# Patient Record
Sex: MALE | Race: WHITE | Employment: OTHER | URBAN - METROPOLITAN AREA
[De-identification: names, ages, dates, MRNs, and addresses within clinical notes are randomized per-mention and may not be internally consistent; named-entity substitution may affect disease eponyms.]

---

## 2019-03-29 ENCOUNTER — HOSPITAL ENCOUNTER (OUTPATIENT)
Dept: PREADMISSION TESTING | Age: 74
Discharge: HOME | End: 2019-03-29
Payer: MEDICARE

## 2019-03-29 ENCOUNTER — HOSPITAL ENCOUNTER (OUTPATIENT)
Dept: NON INVASIVE DIAGNOSTICS | Age: 74
Discharge: HOME | End: 2019-03-29
Attending: OTOLARYNGOLOGY
Payer: MEDICARE

## 2019-03-29 VITALS
SYSTOLIC BLOOD PRESSURE: 140 MMHG | RESPIRATION RATE: 16 BRPM | WEIGHT: 272.4 LBS | HEIGHT: 74 IN | HEART RATE: 73 BPM | OXYGEN SATURATION: 96 % | TEMPERATURE: 98.7 F | DIASTOLIC BLOOD PRESSURE: 70 MMHG | BODY MASS INDEX: 34.96 KG/M2

## 2019-03-29 LAB
ANION GAP SERPL CALC-SCNC: 7 MMOL/L (ref 5–15)
ATRIAL RATE: 54 BPM
BASOPHILS # BLD: 0 K/UL (ref 0–0.1)
BASOPHILS NFR BLD: 1 % (ref 0–1)
BUN SERPL-MCNC: 31 MG/DL (ref 6–20)
BUN/CREAT SERPL: 15 (ref 12–20)
CALCIUM SERPL-MCNC: 8.8 MG/DL (ref 8.5–10.1)
CALCULATED R AXIS, ECG10: 57 DEGREES
CALCULATED T AXIS, ECG11: 22 DEGREES
CHLORIDE SERPL-SCNC: 106 MMOL/L (ref 97–108)
CO2 SERPL-SCNC: 25 MMOL/L (ref 21–32)
CREAT SERPL-MCNC: 2.04 MG/DL (ref 0.7–1.3)
DIAGNOSIS, 93000: NORMAL
DIFFERENTIAL METHOD BLD: ABNORMAL
EOSINOPHIL # BLD: 0.3 K/UL (ref 0–0.4)
EOSINOPHIL NFR BLD: 4 % (ref 0–7)
ERYTHROCYTE [DISTWIDTH] IN BLOOD BY AUTOMATED COUNT: 14.6 % (ref 11.5–14.5)
GLUCOSE SERPL-MCNC: 229 MG/DL (ref 65–100)
HCT VFR BLD AUTO: 36.6 % (ref 36.6–50.3)
HGB BLD-MCNC: 12 G/DL (ref 12.1–17)
IMM GRANULOCYTES # BLD AUTO: 0.1 K/UL (ref 0–0.04)
IMM GRANULOCYTES NFR BLD AUTO: 1 % (ref 0–0.5)
LYMPHOCYTES # BLD: 1.4 K/UL (ref 0.8–3.5)
LYMPHOCYTES NFR BLD: 20 % (ref 12–49)
MCH RBC QN AUTO: 30.8 PG (ref 26–34)
MCHC RBC AUTO-ENTMCNC: 32.8 G/DL (ref 30–36.5)
MCV RBC AUTO: 93.8 FL (ref 80–99)
MONOCYTES # BLD: 0.9 K/UL (ref 0–1)
MONOCYTES NFR BLD: 13 % (ref 5–13)
NEUTS SEG # BLD: 4.2 K/UL (ref 1.8–8)
NEUTS SEG NFR BLD: 61 % (ref 32–75)
NRBC # BLD: 0 K/UL (ref 0–0.01)
NRBC BLD-RTO: 0 PER 100 WBC
PLATELET # BLD AUTO: 239 K/UL (ref 150–400)
PMV BLD AUTO: 8.9 FL (ref 8.9–12.9)
POTASSIUM SERPL-SCNC: 5.1 MMOL/L (ref 3.5–5.1)
Q-T INTERVAL, ECG07: 384 MS
QRS DURATION, ECG06: 94 MS
QTC CALCULATION (BEZET), ECG08: 440 MS
RBC # BLD AUTO: 3.9 M/UL (ref 4.1–5.7)
SODIUM SERPL-SCNC: 138 MMOL/L (ref 136–145)
VENTRICULAR RATE, ECG03: 79 BPM
WBC # BLD AUTO: 6.9 K/UL (ref 4.1–11.1)

## 2019-03-29 PROCEDURE — 36415 COLL VENOUS BLD VENIPUNCTURE: CPT

## 2019-03-29 PROCEDURE — 80048 BASIC METABOLIC PNL TOTAL CA: CPT

## 2019-03-29 PROCEDURE — 85025 COMPLETE CBC W/AUTO DIFF WBC: CPT

## 2019-03-29 PROCEDURE — 93005 ELECTROCARDIOGRAM TRACING: CPT

## 2019-03-29 RX ORDER — VITAMIN B COMPLEX
2500 TABLET ORAL DAILY
COMMUNITY

## 2019-03-29 RX ORDER — CHOLECALCIFEROL TAB 125 MCG (5000 UNIT) 125 MCG
5000 TAB ORAL DAILY
COMMUNITY

## 2019-03-29 RX ORDER — TORSEMIDE 10 MG/1
10 TABLET ORAL
Status: ON HOLD | COMMUNITY
End: 2019-05-01

## 2019-03-29 RX ORDER — TRIAMCINOLONE ACETONIDE 0.25 MG/G
OINTMENT TOPICAL
COMMUNITY

## 2019-03-29 NOTE — PERIOP NOTES
[address]                  
[address] [facility], [address] [place] [address] MAIN OR                                  [phone] MAIN PRE OP                          [phone]                                                                                AMBULATORY PRE OP          [phone] PRE-ADMISSION TESTING    21  Surgery Date:   4/10/2019 Wednesday Is surgery arrival time given by surgeon? When through with MOHS procedure come to [facility] If Deena Jasmine staff will call you between 3 and 7pm the day before your surgery with your arrival time. (If your surgery is on a Monday, we will call you the Friday before.) Call [phone] after 7pm Monday-Friday if you did not receive your arrival time. INSTRUCTIONS BEFORE YOUR SURGERY When You 
Arrive Arrive at the 2nd [address] [facility] on the day of your surgery Have your insurance card, photo ID, and any copayment (if needed) Food 
 and  
Drink NO food or drink after midnight the night before surgery This means NO water, gum, mints, coffee, juice, etc. 
No alcohol (beer, wine, liquor) 24 hours before and after surgery Medications to TAKE Morning of Surgery MEDICATIONS TO TAKE THE MORNING OF SURGERY WITH A SIP OF WATER:  
? Metoprolol Medications To 
STOP      7 days before surgery ? Non-Steroidal anti-inflammatory Drugs (NSAID's): for example, Ibuprofen (Advil, Motrin), Naproxen (Aleve) ? Aspirin, if taking for pain ? Herbal supplements, vitamins, and fish oil 
? Other: 
(Pain medications not listed above, including Tylenol may be taken) Blood Thinners ? Check with Dr. Julissa Gautam, your cardiologist to see when you need to stop and resume your Xarelto. Bathing Clothing Jewelry Valuables ? If you shower the morning of surgery, please do not apply anything to your skin (lotions, powders, deodorant, or makeup, especially mascara) ? Follow all special bath instructions (for total joint replacement, spine and bowel surgeries) ? Do not shave or trim anywhere 24 hours before surgery ? Wear your hair loose or down; no pony-tails, buns, or metal hair clips ? Wear loose, comfortable, clean clothes ? Wear glasses instead of contacts ? Leave money, valuables, and jewelry, including body piercings, at home Going Home       or Spending the Night ? SAME-DAY SURGERY: You must have a responsible adult drive you home and stay with you 24 hours after surgery ? ADMITS: If your doctor is keeping you into the hospital after surgery, leave personal belongings/luggage in your car until you have a hospital room number. Hospital discharge time is 12 noon Drivers must be here before 12 noon unless you are told differently Special Instructions Free  parking 7am-5pm 
  
 
 
Follow all instructions so your surgery wont be cancelled. Please, be on time. If a situation occurs and you are delayed the day of surgery, call [phone]. If your physical condition changes (like a fever, cold, flu, etc.) call your surgeon. The patient was contacted  in person. Home medication reviewed and verified during PAT appointment. The patient verbalizes understanding of all instructions and does not  need reinforcement.

## 2019-04-01 NOTE — PERIOP NOTES
Spoke with patient and instructed to stop Xarelto 3 days prior to surgery. Verbalized understanding.

## 2019-04-09 ENCOUNTER — ANESTHESIA EVENT (OUTPATIENT)
Dept: SURGERY | Age: 74
End: 2019-04-09
Payer: MEDICARE

## 2019-04-10 ENCOUNTER — HOSPITAL ENCOUNTER (OUTPATIENT)
Age: 74
Setting detail: OUTPATIENT SURGERY
Discharge: HOME | End: 2019-04-10
Attending: OTOLARYNGOLOGY | Admitting: OTOLARYNGOLOGY
Payer: MEDICARE

## 2019-04-10 ENCOUNTER — ANESTHESIA (OUTPATIENT)
Dept: SURGERY | Age: 74
End: 2019-04-10
Payer: MEDICARE

## 2019-04-10 VITALS
TEMPERATURE: 98.1 F | OXYGEN SATURATION: 97 % | HEIGHT: 74 IN | BODY MASS INDEX: 34.46 KG/M2 | DIASTOLIC BLOOD PRESSURE: 64 MMHG | HEART RATE: 65 BPM | RESPIRATION RATE: 12 BRPM | WEIGHT: 268.52 LBS | SYSTOLIC BLOOD PRESSURE: 135 MMHG

## 2019-04-10 DIAGNOSIS — Z98.890 MOHS DEFECT OF NOSE: Primary | ICD-10-CM

## 2019-04-10 DIAGNOSIS — M95.0 MOHS DEFECT OF NOSE: Primary | ICD-10-CM

## 2019-04-10 LAB
GLUCOSE BLD STRIP.AUTO-MCNC: 220 MG/DL (ref 65–100)
GLUCOSE BLD STRIP.AUTO-MCNC: 238 MG/DL (ref 65–100)
SERVICE CMNT-IMP: ABNORMAL
SERVICE CMNT-IMP: ABNORMAL

## 2019-04-10 PROCEDURE — 77030008684 HC TU ET CUF COVD -B: Performed by: NURSE ANESTHETIST, CERTIFIED REGISTERED

## 2019-04-10 PROCEDURE — 74011250636 HC RX REV CODE- 250/636

## 2019-04-10 PROCEDURE — 76210000021 HC REC RM PH II 0.5 TO 1 HR: Performed by: OTOLARYNGOLOGY

## 2019-04-10 PROCEDURE — 74011000250 HC RX REV CODE- 250

## 2019-04-10 PROCEDURE — 77030013079 HC BLNKT BAIR HGGR 3M -A: Performed by: NURSE ANESTHETIST, CERTIFIED REGISTERED

## 2019-04-10 PROCEDURE — 77030002986 HC SUT PROL J&J -A: Performed by: OTOLARYNGOLOGY

## 2019-04-10 PROCEDURE — 77030020782 HC GWN BAIR PAWS FLX 3M -B

## 2019-04-10 PROCEDURE — 77030018836 HC SOL IRR NACL ICUM -A: Performed by: OTOLARYNGOLOGY

## 2019-04-10 PROCEDURE — 77030032490 HC SLV COMPR SCD KNE COVD -B

## 2019-04-10 PROCEDURE — 77030011640 HC PAD GRND REM COVD -A: Performed by: OTOLARYNGOLOGY

## 2019-04-10 PROCEDURE — 77030026438 HC STYL ET INTUB CARD -A: Performed by: NURSE ANESTHETIST, CERTIFIED REGISTERED

## 2019-04-10 PROCEDURE — 77030040356 HC CORD BPLR FRCP COVD -A: Performed by: OTOLARYNGOLOGY

## 2019-04-10 PROCEDURE — 74011000250 HC RX REV CODE- 250: Performed by: OTOLARYNGOLOGY

## 2019-04-10 PROCEDURE — 74011250636 HC RX REV CODE- 250/636: Performed by: OTOLARYNGOLOGY

## 2019-04-10 PROCEDURE — 76060000036 HC ANESTHESIA 2.5 TO 3 HR: Performed by: OTOLARYNGOLOGY

## 2019-04-10 PROCEDURE — 77030031139 HC SUT VCRL2 J&J -A: Performed by: OTOLARYNGOLOGY

## 2019-04-10 PROCEDURE — 77030002974 HC SUT PLN J&J -A: Performed by: OTOLARYNGOLOGY

## 2019-04-10 PROCEDURE — 82962 GLUCOSE BLOOD TEST: CPT

## 2019-04-10 PROCEDURE — 74011000272 HC RX REV CODE- 272: Performed by: OTOLARYNGOLOGY

## 2019-04-10 PROCEDURE — 77030003666 HC NDL SPINAL BD -A: Performed by: OTOLARYNGOLOGY

## 2019-04-10 PROCEDURE — 76010000132 HC OR TIME 2.5 TO 3 HR: Performed by: OTOLARYNGOLOGY

## 2019-04-10 PROCEDURE — 76210000006 HC OR PH I REC 0.5 TO 1 HR: Performed by: OTOLARYNGOLOGY

## 2019-04-10 PROCEDURE — 77030020061 HC IV BLD WRMR ADMIN SET 3M -B: Performed by: NURSE ANESTHETIST, CERTIFIED REGISTERED

## 2019-04-10 PROCEDURE — 74011250637 HC RX REV CODE- 250/637: Performed by: OTOLARYNGOLOGY

## 2019-04-10 RX ORDER — FLUMAZENIL 0.1 MG/ML
0.2 INJECTION INTRAVENOUS
Status: DISCONTINUED | OUTPATIENT
Start: 2019-04-10 | End: 2019-04-10 | Stop reason: HOSPADM

## 2019-04-10 RX ORDER — PROPOFOL 10 MG/ML
INJECTION, EMULSION INTRAVENOUS AS NEEDED
Status: DISCONTINUED | OUTPATIENT
Start: 2019-04-10 | End: 2019-04-10 | Stop reason: HOSPADM

## 2019-04-10 RX ORDER — HYDROMORPHONE HYDROCHLORIDE 1 MG/ML
.25-1 INJECTION, SOLUTION INTRAMUSCULAR; INTRAVENOUS; SUBCUTANEOUS
Status: DISCONTINUED | OUTPATIENT
Start: 2019-04-10 | End: 2019-04-10 | Stop reason: HOSPADM

## 2019-04-10 RX ORDER — SODIUM CHLORIDE 9 MG/ML
INJECTION, SOLUTION INTRAVENOUS
Status: DISCONTINUED | OUTPATIENT
Start: 2019-04-10 | End: 2019-04-10 | Stop reason: HOSPADM

## 2019-04-10 RX ORDER — ONDANSETRON 4 MG/1
4 TABLET, ORALLY DISINTEGRATING ORAL
Qty: 20 TAB | Refills: 2 | Status: SHIPPED | OUTPATIENT
Start: 2019-04-10

## 2019-04-10 RX ORDER — BACITRACIN ZINC 500 UNIT/G
OINTMENT (GRAM) TOPICAL
Qty: 15 G | Refills: 0 | Status: SHIPPED | OUTPATIENT
Start: 2019-04-10

## 2019-04-10 RX ORDER — FENTANYL CITRATE 50 UG/ML
INJECTION, SOLUTION INTRAMUSCULAR; INTRAVENOUS AS NEEDED
Status: DISCONTINUED | OUTPATIENT
Start: 2019-04-10 | End: 2019-04-10 | Stop reason: HOSPADM

## 2019-04-10 RX ORDER — SODIUM CHLORIDE, SODIUM LACTATE, POTASSIUM CHLORIDE, CALCIUM CHLORIDE 600; 310; 30; 20 MG/100ML; MG/100ML; MG/100ML; MG/100ML
125 INJECTION, SOLUTION INTRAVENOUS CONTINUOUS
Status: DISCONTINUED | OUTPATIENT
Start: 2019-04-10 | End: 2019-04-10 | Stop reason: HOSPADM

## 2019-04-10 RX ORDER — NALOXONE HYDROCHLORIDE 0.4 MG/ML
0.2 INJECTION, SOLUTION INTRAMUSCULAR; INTRAVENOUS; SUBCUTANEOUS
Status: DISCONTINUED | OUTPATIENT
Start: 2019-04-10 | End: 2019-04-10 | Stop reason: HOSPADM

## 2019-04-10 RX ORDER — CEFDINIR 300 MG/1
600 CAPSULE ORAL DAILY
Qty: 14 CAP | Refills: 0 | Status: SHIPPED | OUTPATIENT
Start: 2019-04-10 | End: 2019-04-17

## 2019-04-10 RX ORDER — EPHEDRINE SULFATE 50 MG/ML
INJECTION, SOLUTION INTRAVENOUS AS NEEDED
Status: DISCONTINUED | OUTPATIENT
Start: 2019-04-10 | End: 2019-04-10 | Stop reason: HOSPADM

## 2019-04-10 RX ORDER — ONDANSETRON 2 MG/ML
INJECTION INTRAMUSCULAR; INTRAVENOUS AS NEEDED
Status: DISCONTINUED | OUTPATIENT
Start: 2019-04-10 | End: 2019-04-10 | Stop reason: HOSPADM

## 2019-04-10 RX ORDER — DEXAMETHASONE SODIUM PHOSPHATE 10 MG/ML
10 INJECTION INTRAMUSCULAR; INTRAVENOUS
Status: COMPLETED | OUTPATIENT
Start: 2019-04-10 | End: 2019-04-10

## 2019-04-10 RX ORDER — HYDROCODONE BITARTRATE AND ACETAMINOPHEN 5; 325 MG/1; MG/1
2 TABLET ORAL
Status: COMPLETED | OUTPATIENT
Start: 2019-04-10 | End: 2019-04-10

## 2019-04-10 RX ORDER — HYDROCODONE BITARTRATE AND ACETAMINOPHEN 5; 325 MG/1; MG/1
1 TABLET ORAL
Qty: 18 TAB | Refills: 0 | Status: SHIPPED | OUTPATIENT
Start: 2019-04-10 | End: 2019-04-13

## 2019-04-10 RX ORDER — ROCURONIUM BROMIDE 10 MG/ML
INJECTION, SOLUTION INTRAVENOUS AS NEEDED
Status: DISCONTINUED | OUTPATIENT
Start: 2019-04-10 | End: 2019-04-10 | Stop reason: HOSPADM

## 2019-04-10 RX ORDER — LIDOCAINE HYDROCHLORIDE AND EPINEPHRINE 10; 10 MG/ML; UG/ML
INJECTION, SOLUTION INFILTRATION; PERINEURAL AS NEEDED
Status: DISCONTINUED | OUTPATIENT
Start: 2019-04-10 | End: 2019-04-10 | Stop reason: HOSPADM

## 2019-04-10 RX ORDER — LIDOCAINE HYDROCHLORIDE 10 MG/ML
0.1 INJECTION, SOLUTION EPIDURAL; INFILTRATION; INTRACAUDAL; PERINEURAL AS NEEDED
Status: DISCONTINUED | OUTPATIENT
Start: 2019-04-10 | End: 2019-04-10 | Stop reason: HOSPADM

## 2019-04-10 RX ORDER — SUCCINYLCHOLINE CHLORIDE 20 MG/ML
INJECTION INTRAMUSCULAR; INTRAVENOUS AS NEEDED
Status: DISCONTINUED | OUTPATIENT
Start: 2019-04-10 | End: 2019-04-10 | Stop reason: HOSPADM

## 2019-04-10 RX ORDER — LIDOCAINE HCL/PF 100 MG/5ML
SYRINGE (ML) INTRAVENOUS AS NEEDED
Status: DISCONTINUED | OUTPATIENT
Start: 2019-04-10 | End: 2019-04-10 | Stop reason: HOSPADM

## 2019-04-10 RX ORDER — CEFAZOLIN SODIUM/WATER 2 G/20 ML
2 SYRINGE (ML) INTRAVENOUS
Status: COMPLETED | OUTPATIENT
Start: 2019-04-10 | End: 2019-04-10

## 2019-04-10 RX ORDER — DIPHENHYDRAMINE HYDROCHLORIDE 50 MG/ML
12.5 INJECTION, SOLUTION INTRAMUSCULAR; INTRAVENOUS AS NEEDED
Status: DISCONTINUED | OUTPATIENT
Start: 2019-04-10 | End: 2019-04-10 | Stop reason: HOSPADM

## 2019-04-10 RX ADMIN — Medication 2 G: at 14:07

## 2019-04-10 RX ADMIN — DEXAMETHASONE SODIUM PHOSPHATE 10 MG: 10 INJECTION, SOLUTION INTRAMUSCULAR; INTRAVENOUS at 14:08

## 2019-04-10 RX ADMIN — EPHEDRINE SULFATE 10 MG: 50 INJECTION, SOLUTION INTRAVENOUS at 14:08

## 2019-04-10 RX ADMIN — HYDROCODONE BITARTRATE AND ACETAMINOPHEN 2 TABLET: 5; 325 TABLET ORAL at 17:51

## 2019-04-10 RX ADMIN — FENTANYL CITRATE 50 MCG: 50 INJECTION, SOLUTION INTRAMUSCULAR; INTRAVENOUS at 13:51

## 2019-04-10 RX ADMIN — PROPOFOL 50 MG: 10 INJECTION, EMULSION INTRAVENOUS at 14:02

## 2019-04-10 RX ADMIN — ROCURONIUM BROMIDE 5 MG: 10 INJECTION, SOLUTION INTRAVENOUS at 13:51

## 2019-04-10 RX ADMIN — SODIUM CHLORIDE: 9 INJECTION, SOLUTION INTRAVENOUS at 12:58

## 2019-04-10 RX ADMIN — EPHEDRINE SULFATE 10 MG: 50 INJECTION, SOLUTION INTRAVENOUS at 14:05

## 2019-04-10 RX ADMIN — PROPOFOL 200 MG: 10 INJECTION, EMULSION INTRAVENOUS at 13:51

## 2019-04-10 RX ADMIN — FENTANYL CITRATE 50 MCG: 50 INJECTION, SOLUTION INTRAMUSCULAR; INTRAVENOUS at 14:28

## 2019-04-10 RX ADMIN — SUCCINYLCHOLINE CHLORIDE 100 MG: 20 INJECTION INTRAMUSCULAR; INTRAVENOUS at 13:52

## 2019-04-10 RX ADMIN — ONDANSETRON 4 MG: 2 INJECTION INTRAMUSCULAR; INTRAVENOUS at 14:07

## 2019-04-10 RX ADMIN — EPHEDRINE SULFATE 10 MG: 50 INJECTION, SOLUTION INTRAVENOUS at 13:52

## 2019-04-10 RX ADMIN — Medication 60 MG: at 13:51

## 2019-04-10 RX ADMIN — FENTANYL CITRATE 50 MCG: 50 INJECTION, SOLUTION INTRAMUSCULAR; INTRAVENOUS at 13:43

## 2019-04-10 RX ADMIN — FENTANYL CITRATE 50 MCG: 50 INJECTION, SOLUTION INTRAMUSCULAR; INTRAVENOUS at 14:57

## 2019-04-10 RX ADMIN — SODIUM CHLORIDE: 9 INJECTION, SOLUTION INTRAVENOUS at 16:21

## 2019-04-10 NOTE — ANESTHESIA PREPROCEDURE EVALUATION
Relevant Problems No relevant active problems Anesthetic History No history of anesthetic complications Review of Systems / Medical History Patient summary reviewed, nursing notes reviewed and pertinent labs reviewed Pulmonary Within defined limits Neuro/Psych Within defined limits Cardiovascular Hypertension Valvular problems/murmurs: mitral insufficiency Dysrhythmias : atrial fibrillation Hyperlipidemia Exercise tolerance: >4 METS 
  
GI/Hepatic/Renal 
  
 
 
Renal disease: CRI Endo/Other Diabetes Other Findings Physical Exam 
 
Airway Mallampati: I 
TM Distance: 4 - 6 cm Neck ROM: normal range of motion Mouth opening: Normal 
 
 Cardiovascular Rhythm: regular Rate: normal 
 
 
 
 Dental 
 
Dentition: Lower dentition intact and Upper dentition intact Pulmonary Breath sounds clear to auscultation Abdominal 
 
 
 
 Other Findings Anesthetic Plan ASA: 3 Anesthesia type: general 
 
 
 
 
Induction: Intravenous Anesthetic plan and risks discussed with: Patient

## 2019-04-10 NOTE — ANESTHESIA POSTPROCEDURE EVALUATION
Procedure(s): 
RECONSTRUCTION POST MOHS SURGERY TO NASAL TIP; FOREHEAD FLAP. general 
 
Anesthesia Post Evaluation Patient location during evaluation: PACU Level of consciousness: awake Pain management: adequate Airway patency: patent Anesthetic complications: no 
Cardiovascular status: acceptable Respiratory status: acceptable Hydration status: acceptable Vitals Value Taken Time /62 4/10/2019  5:00 PM  
Temp 36.7 °C (98.1 °F) 4/10/2019  5:00 PM  
Pulse 69 4/10/2019  5:03 PM  
Resp 19 4/10/2019  5:03 PM  
SpO2 94 % 4/10/2019  5:03 PM  
Vitals shown include unvalidated device data.

## 2019-04-10 NOTE — DISCHARGE INSTRUCTIONS
Patient Discharge Instructions    Marcello Nur / [id number] : 1945    Admitted 4/10/2019 Discharged: 4/10/2019            Facial Reconstruction Surgery Post-Operative Instructions     Have someone help you at home for 1-2 days. Get plenty of rest.  Follow a balanced diet. Take pain medication as prescribed. Do not take aspirin (or products that contain aspirin) unless approved by your surgeon. Do not drink alcohol while taking pain medication. DO NOT SMOKE. Smoking decreases blood flow. It can delay wound healing or cause tissue loss. Activities  Start walking as soon as possible, this helps to reduce swelling and lowers the chance of blood clots. Do not drive until you are no longer taking pain medication (narcotics). You may tire easily. Plan on taking it easy for the first week. No strenuous activity, including sex and heavy housework for 2 weeks post op. Avoid bending over. Keep your head elevated. Incision Care    Forehead Flaps  There will be a small bridge of skin from your eyebrow to your nose. There will be bloody drainage. This is expected. You can use a Q tip with peroxide and water (1/2 and 1/2) to gently clean along the bridged area. The sutures (stitches) will usually be removed in 1 week. No dressing is necessary. You should apply antibiotic ointment 3-4 times per day to the incision line and on the exposed tissue of the bridge. This bridge will stay in place until the final surgery. The final division and inset of flap will occur at that time, usually 3-4 weeks after the initial surgery. Remove white dressing to forehead in 24hrs  What To Expect  Maximum discomfort should occur in the first few days, improving each day thereafter. Bruising, swelling, numbness, tightness and tenderness of skin for 10 - 14 days. The face may look and feel strange and be distorted from the swelling. Sensation may be decreased. This may or may not fully return. When to Call  If you have increased swelling or bruising. If you have increased redness along the incision. If you have increased pain that is not relieved by your pain medication. If you have any side effect from your medication; rash, nausea, vomiting, diarrhea, etc.   If you have a temperature greater than 101.0F   If you have yellow or green drainage from the incisions or notice a foul odor. If you have bleeding from the incisions that is difficult to control with light pressure. Follow-up with Rafael Rowe MD on 4/18/19 at 4:30pm.      If you have any questions, please call us at [phone]. We are always happy to answer your questions, and if you should have a problem, this number is answered 24 hours a day. DISCHARGE SUMMARY from Nurse    PATIENT INSTRUCTIONS:    After general anesthesia or intravenous sedation, for 24 hours or while taking prescription Narcotics:  · Limit your activities  · Do not drive and operate hazardous machinery  · Do not make important personal or business decisions  · Do  not drink alcoholic beverages  · If you have not urinated within 8 hours after discharge, please contact your surgeon on call. Report the following to your surgeon:  · Excessive pain, swelling, redness or odor of or around the surgical area  · Temperature over 100.5  · Nausea and vomiting lasting longer than 4 hours or if unable to take medications  · Any signs of decreased circulation or nerve impairment to extremity: change in color, persistent  numbness, tingling, coldness or increase pain  · Any questions    What to do at Home:  Recommended activity: Activity as tolerated and no driving for today,     *  Please give a list of your current medications to your Primary Care Provider. *  Please update this list whenever your medications are discontinued, doses are      changed, or new medications (including over-the-counter products) are added.     *  Please carry medication information at all times in case of emergency situations. These are general instructions for a healthy lifestyle:    No smoking/ No tobacco products/ Avoid exposure to second hand smoke  Surgeon General's Warning:  Quitting smoking now greatly reduces serious risk to your health. Obesity, smoking, and sedentary lifestyle greatly increases your risk for illness    A healthy diet, regular physical exercise & weight monitoring are important for maintaining a healthy lifestyle    You may be retaining fluid if you have a history of heart failure or if you experience any of the following symptoms:  Weight gain of 3 pounds or more overnight or 5 pounds in a week, increased swelling in our hands or feet or shortness of breath while lying flat in bed. Please call your doctor as soon as you notice any of these symptoms; do not wait until your next office visit. Recognize signs and symptoms of STROKE:    F-face looks uneven    A-arms unable to move or move unevenly    S-speech slurred or non-existent    T-time-call 911 as soon as signs and symptoms begin-DO NOT go       Back to bed or wait to see if you get better-TIME IS BRAIN. Warning Signs of HEART ATTACK     Call 911 if you have these symptoms:   Chest discomfort. Most heart attacks involve discomfort in the center of the chest that lasts more than a few minutes, or that goes away and comes back. It can feel like uncomfortable pressure, squeezing, fullness, or pain.  Discomfort in other areas of the upper body. Symptoms can include pain or discomfort in one or both arms, the back, neck, jaw, or stomach.  Shortness of breath with or without chest discomfort.  Other signs may include breaking out in a cold sweat, nausea, or lightheadedness. Don't wait more than five minutes to call 911 - MINUTES MATTER! Fast action can save your life. Calling 911 is almost always the fastest way to get lifesaving treatment.  Emergency Medical Services staff can begin treatment when they arrive -- up to an hour sooner than if someone gets to the hospital by car. The discharge information has been reviewed with the sister. The sister verbalized understanding. Discharge medications reviewed with the sister and appropriate educational materials and side effects teaching were provided. ___________________________________________________________________________________________________________________________________    Ankle Pumps    Ankle pumps increase the circulation of oxygenated blood to your lower extremities and decrease your risk for circulation problems such as blood clots. They also stretch the muscles, tendons and ligaments in your foot and ankle, and prevent joint contracture in the ankle and foot, especially after surgeries on the legs. It is important to do ankle pump exercises regularly after surgery because immobility increases your risk for developing a blood clot. Your doctor may also have you take an Aspirin for the next few days as well. If your doctor did not ask you to take an Aspirin, consult with him before starting Aspirin therapy on your own. The exercise is quite simple. 6. Slowly point your foot forward, feeling the muscles on the top of your lower leg stretch, and hold this position for 5 seconds. 7. Next, pull your foot back toward you as far as possible, stretching the calf muscles, and hold that position for 5 seconds. 8. Repeat with the other foot. 9. Perform 10 repetitions every hour while awake for both ankles if possible (down and then up with the foot once is one repetition). You should feel gentle stretching of the muscles in your lower leg when doing this exercise. If you feel pain, or your range of motion is limited, don't  Push too hard. Only go the limit your joint and muscles will let you go.   If you have increasing pain, progressively worsening leg warmth or swelling, STOP the exercise and call your doctor.

## 2019-04-10 NOTE — H&P
Otolaryngology, Head and Neck Surgery    Chief Complaint:    History of Present Illness:   Patient is a 66 y.o. male who is being seen for Mohs reconstruction. Underwent excision of a non-melanoma skin cancer from the nose earlier today by Dr. Jesica Ken. .      Past Medical History:   Diagnosis Date    [facility]fib ([facility]     BPH (benign prostatic hyperplasia)     CKD (chronic kidney disease) stage 3, GFR 30-59 ml/min [facility])     Diabetes ([facility]     Dyslipidemia     Eczema     GI bleed 2016    Hypertension     Mitral insufficiency     post MVR    Peripheral neuropathy     Skin cancer       History reviewed. No pertinent family history. Social History     Tobacco Use    Smoking status: Former Smoker    Smokeless tobacco: Never Used    Tobacco comment: Socially in college   Substance Use Topics    Alcohol use: Yes     Alcohol/week: 8.4 oz     Types: 7 Cans of beer, 7 Shots of liquor per week     Past Surgical History:   Procedure Laterality Date    HX COLONOSCOPY N/A     HX ENDOSCOPY N/A 2016    HX MITRAL VALVE REPLACEMENT N/A     with Enmanuel Harrington MV ring and repair    HX WISDOM TEETH EXTRACTION N/A       Current Facility-Administered Medications   Medication Dose Route Frequency    ceFAZolin (ANCEF) 2 g/20 mL in sterile water IV syringe  2 g IntraVENous ON CALL TO OR    dexamethasone (PF) (DECADRON) injection 10 mg  10 mg IntraVENous ON CALL TO OR      Allergies   Allergen Reactions    Other Food Nausea Only     Scallops    Zestril [Lisinopril] Other (comments)     Syncope          Review of Systems:  Pertinent items are noted in the History of Present Illness.     Objective:     Patient Vitals for the past 8 hrs:   BP Temp Pulse Resp SpO2 Height Weight   04/10/19 1254 175/69 98.5 °F (36.9 °C) 74 18 99 % 6' 2\" (1.88 m) 121.8 kg (268 lb 8.3 oz)   04/10/19 1215 -- -- -- -- -- 6' 2\" (1.88 m) 121.8 kg (268 lb 8.3 oz)     Temp (24hrs), Av.5 °F (36.9 °C), Min:98.5 °F (36.9 °C), Max:98.5 °F (36.9 °C)    No intake/output data recorded. PHYSICAL EXAM:    CONSTITUTIONAL:  Well nourished individual in no acute distress. The patient is able to communicate with normal voice quality. HEAD AND NECK EXAM:    HEAD & FACE: No head or facial abnormalities present. No masses or lesions present. Overall appearance is normal. Facial strength is normal.  EYES: Conjunctiva normal.  No abnormalities of the lids or globes. Extraocular movements intact and conjugate. EARS: No significant abnormality of the external ear. NOSE: Dressing in place, clean. SINUSES: The paranasal sinus regions are non-tender to palpation. ORAL CAVITY/OROPHARYNX: Lips and gums are without lesions. Oral cavity and oropharynx are without mucosal lesions or abnormalities. Tonsillar fossae, palate, tongue and uvula without abnormality. No edema. No erythema. NECK: No palpable lymphadenopathy or other masses in the neck. The trachea and larynx are midline. No thyroid enlargement or nodules appreciated. No abnormality of the parotid or submandibular glands present. LYMPHATIC: No lymphadenopathy in the neck/head. CHEST:  CTA  HEART:  RRR  NEUROLOGIC/PSYCHIATRIC:    NEUROLOGIC:  Cranial nerves 3, 4, 5, 6, 7, 10 (soft palate elevation), 11 and 12 bilaterally intact and symmetric. ORIENTATION/MOOD/AFFECT: Oriented to person, place, time and general circumstances. Mood and affect appropriate. Assessment:     Nasal Mohs defect    Plan:     Ready to proceed with closure of nasal Mohs defect. Based on the size and location of the defect, we will very likely need to perform a paramedian forehead flap. Discussed again in detail. Questions answered. Consent signed. Signed By: Nuvia Solomon MD     April 10, 2019       Mikey Mai MD, [facility], [facility], [facility]  [email or web address]  [email or web address]  [address] [place] [address], [address]  [place], [place] [address]  Ph:  [phone]  Fax: [phone]

## 2019-04-10 NOTE — BRIEF OP NOTE
BRIEF OPERATIVE NOTE    Date of Procedure: 4/10/2019   Preoperative Diagnosis: BASALCELL CARCINOMA OF THE NOSE  Postoperative Diagnosis: BASALCELL CARCINOMA OF THE NOSE    Procedure(s):  RECONSTRUCTION POST MOHS SURGERY TO NASAL TIP; FOREHEAD FLAP  Surgeon(s) and Role:     * Ned Caldwell MD - Primary         Surgical Assistant: none    Surgical Staff:  Circ-1: Margaret Chu RN  Circ-Relief: Osmani Sanchez RN  Circ-Intern: Zena Narayanan RN  Scrub Tech-1: Tolbert Galeazzi Alonzo Shaker  Scrub Tech-Relief: Melvin Livingston  Event Time In Time Out   Incision Start 1429    Incision Close 1604      Anesthesia: General   Estimated Blood Loss: min  Specimens: * No specimens in log *   Findings: 2.4x2.4cm nasal skin defect   Complications: none  Implants: * No implants in log *

## 2019-04-11 NOTE — OP NOTES
[facility]  OPERATIVE REPORT    Name:  Dali Scott  MR#:  [id number]  :  1945  ACCOUNT #:  [de-identified]  DATE OF SERVICE:  04/10/2019      PREOPERATIVE DIAGNOSIS:  Nasal Mohs defect. POSTOPERATIVE DIAGNOSIS:  Nasal Mohs defect. PROCEDURE PERFORMED:  Closure of nasal Mohs defect with paramedian forehead interpolated flap. SURGEON:  Lucretia Sanchez MD    ASSISTANT:  none    ANESTHESIA:  General.    COMPLICATIONS:  None. SPECIMENS REMOVED:  None. IMPLANTS:  none. ESTIMATED BLOOD LOSS:  Minimal.    FINDINGS:  A 2.4 x 2.4-cm defect in the nasal skin starting on the tip and extending to the supratip. There was exposed cartilage but no obvious cartilage defect. INDICATIONS FOR PROCEDURE:  The patient is a 69-year-old male who underwent excision of a nasal skin cancer earlier in the day via Mohs technique. He presents for reconstruction. PROCEDURE IN DETAIL:  After informed consent, the patient was taken to the operating room and placed on the table in supine position. After general anesthesia, the table was turned 180 degrees. The dressing previously applied was removed and the defect was measured and examined. The periphery of the nose around the defect was injected with 1% lidocaine with 1:100,000 epinephrine. The supratrochlear artery on the right side was Dopplered and marked. There was a strong signal on the right side. The right side was chosen based on the quality of the forehead skin in the region of the flap placement. The defect was more or less in the midline although slightly off to the left. The peripheral forehead was injected with 1% lidocaine with 1:100,000 epinephrine but no injection was placed over the region of the inflamed flap or the pedicle. The patient was prepped and draped in standard fashion.   Initially to try to make the defect more central, the defect laterally on the left side was elevated in the supraperichondrial and supraperiosteal planes then advanced medially and secured to the dorsal periosteum using 4-0 Vicryl suture. This was done to narrow the defect slightly and to make the defect more centrally located and symmetric. A template was then created of the defect precisely first with Telfa and then transferred to suture pack foil. This was trimmed precisely to fit the defect. The defect was transferred to the forehead at a point where the pedicle was deemed to be adequate to reach the defect with no tension. This was marked with the marking pen and then a pedicle was designed around the marked supratrochlear artery, tapering to about 1.3 cm at the eyebrow. The incision was made with 15-blade around the template and then into the pedicle. Initially, the flap was raised in the subcutaneous plane leaving a small area of subcutaneous fat on the flap. After a few centimeters where the flap was to be inset, the dissection was changed to a deeper level in the subgaleal plane and this extended down to the eyebrow. Spreading dissection was then done over the eyebrow through the  muscles to make sure that the pedicle was saved. No bleeding was seen over this maneuver. The flap was checked for a reach and there was found to be adequate link at this point. The forehead was then widely undermined on both sides in the subgaleal plane using scissors and finger dissection. This extended above the defect as well. A dog ear superiorly was excised extending more superiorly in the scalp. The forehead defect was then completely closed using a deep 3-0 Vicryl sutures for the deep layer and then full thickness 4-0 Prolene tacking sutures, and then 5-0 plain running locking sutures for the final layer. The flap was examined. There was excellent blood supply at this point all the way to the tip of the flap. The flap was conservatively trimmed down to the dermal layer taking great care not to injure the dermal plexus. This was done fairly thinly because the patient had quite thin skin on the nose on the tip. The thickness engaged extending up more proximally with a slightly thicker flap to match the depth of the defect. The defect was cleaned with antibiotic saline on a gauze and any minor oozing was controlled with needle tip Bovie cautery. A small amount of low power cautery was performed on the flap to make sure there was no significant oozing. The flap was then inset and secured with multiple interrupted 5-0 Prolene sutures and then some intervening 6-0 fast-absorbing gut sutures. The contour on the tip was excellent at this point. The pedicle was again examined and minor oozing along the edges of the pedicle were cauterized with a needle-tip Bovie cautery. There was no significant bleeding at this point. Flap capillary refill was excellent at this point. Xeroform was wrapped around the pedicle and this concluded the procedure. A light pressure dressing was placed  over the forehead closure with Bacitracin, Telfa, and tape. The patient was cleaned of all Betadine and then some Nitro-Bid paste was placed on the flap in a routine fashion and then Bacitracin was placed on the suture line. The patient was then awakened from anesthesia, extubated, and taken to the PACU in stable condition. There were no complications. The patient tolerated the procedure well.         MD DENEEN Barker/[email or web address]/  D:  04/10/2019 16:37  T:  04/11/2019 2:37  JOB #:  [id number]

## 2019-04-30 NOTE — PERIOP NOTES
[address], [address] [facility]                            MAIN OR                                  [phone]   MAIN PRE OP                          [phone]                                                                                AMBULATORY PRE OP          [phone]  PRE-ADMISSION TESTING    [phone]     Surgery Date:   5/1/19         Is surgery arrival time given by surgeon? NO  If NO, [facility] staff will call you between 3 and 7pm the day before your surgery with your arrival time. (If your surgery is on a Monday, we will call you the Friday before.)    Call [phone] after 7pm Monday-Friday if you did not receive your arrival time. INSTRUCTIONS BEFORE YOUR SURGERY   When You  Arrive   Arrive at the [address] [facility] on the day of your surgery  Have your insurance card, photo ID, and any copayment (if needed)     Food   and   Drink   NO food or drink after midnight the night before surgery    This means NO water, gum, mints, coffee, juice, etc.  No alcohol (beer, wine, liquor) 24 hours before and after surgery     Medications to   TAKE   Morning of Surgery   MEDICATIONS TO TAKE THE MORNING OF SURGERY WITH A SIP OF WATER:    Metoprolol     Medications  To  STOP      7 days before surgery    Non-Steroidal anti-inflammatory Drugs (NSAID's): for example, Ibuprofen (Advil, Motrin), Naproxen (Aleve)   Aspirin, if taking for pain    Herbal supplements, vitamins, and fish oil   Other:  (Pain medications not listed above, including Tylenol may be taken)   Blood  Thinners    If you take  Aspirin, Plavix, Coumadin, or any blood-thinning or anti-blood clot medicine, talk to the doctor who prescribed the medications for pre-operative instructions.      Bathing Clothing  Jewelry  Valuables       If you shower the morning of surgery, please do not apply anything to your skin (lotions, powders, deodorant, or makeup, especially lorie)   Follow all special bath instructions (for total joint replacement, spine and bowel surgeries)   Do not shave or trim anywhere 24 hours before surgery   Wear your hair loose or down; no pony-tails, buns, or metal hair clips   Wear loose, comfortable, clean clothes   Wear glasses instead of contacts   Leave money, valuables, and jewelry, including body piercings, at home     Going Home       or Spending the Night    SAME-DAY SURGERY: You must have a responsible adult drive you home and stay with you 24 hours after surgery   ADMITS: If your doctor is keeping you into the hospital after surgery, leave personal belongings/luggage in your car until you have a hospital room number. Hospital discharge time is 12 noon  Drivers must be here before 12 noon unless you are told differently   Special Instructions Free  parking, unable to complete PAT interview patient had to end the call and requested this finished tomorrow the day of surgery       Follow all instructions so your surgery wont be cancelled. Please, be on time. If a situation occurs and you are delayed the day of surgery, call [phone]     If your physical condition changes (like a fever, cold, flu, etc.) call your surgeon. The patient was contacted  via phone. Home medication reviewed and verified during PAT appointment. The patient verbalizes understanding of all instructions and does  need reinforcement.

## 2019-05-01 ENCOUNTER — ANESTHESIA EVENT (OUTPATIENT)
Dept: SURGERY | Age: 74
End: 2019-05-01
Payer: MEDICARE

## 2019-05-01 ENCOUNTER — HOSPITAL ENCOUNTER (OUTPATIENT)
Age: 74
Setting detail: OUTPATIENT SURGERY
Discharge: HOME | End: 2019-05-01
Attending: OTOLARYNGOLOGY | Admitting: OTOLARYNGOLOGY
Payer: MEDICARE

## 2019-05-01 ENCOUNTER — ANESTHESIA (OUTPATIENT)
Dept: SURGERY | Age: 74
End: 2019-05-01
Payer: MEDICARE

## 2019-05-01 VITALS
BODY MASS INDEX: 34.77 KG/M2 | HEIGHT: 74 IN | TEMPERATURE: 97.8 F | DIASTOLIC BLOOD PRESSURE: 73 MMHG | RESPIRATION RATE: 18 BRPM | WEIGHT: 270.95 LBS | OXYGEN SATURATION: 96 % | HEART RATE: 72 BPM | SYSTOLIC BLOOD PRESSURE: 155 MMHG

## 2019-05-01 DIAGNOSIS — M95.0 MOHS DEFECT OF NOSE: Primary | ICD-10-CM

## 2019-05-01 DIAGNOSIS — Z98.890 MOHS DEFECT OF NOSE: Primary | ICD-10-CM

## 2019-05-01 LAB
GLUCOSE BLD STRIP.AUTO-MCNC: 229 MG/DL (ref 65–100)
GLUCOSE BLD STRIP.AUTO-MCNC: 250 MG/DL (ref 65–100)
GLUCOSE BLD STRIP.AUTO-MCNC: 307 MG/DL (ref 65–100)
SERVICE CMNT-IMP: ABNORMAL

## 2019-05-01 PROCEDURE — 76210000021 HC REC RM PH II 0.5 TO 1 HR: Performed by: OTOLARYNGOLOGY

## 2019-05-01 PROCEDURE — 74011000250 HC RX REV CODE- 250: Performed by: OTOLARYNGOLOGY

## 2019-05-01 PROCEDURE — 74011250636 HC RX REV CODE- 250/636: Performed by: OTOLARYNGOLOGY

## 2019-05-01 PROCEDURE — 77030032490 HC SLV COMPR SCD KNE COVD -B

## 2019-05-01 PROCEDURE — 74011250636 HC RX REV CODE- 250/636

## 2019-05-01 PROCEDURE — 77030031139 HC SUT VCRL2 J&J -A: Performed by: OTOLARYNGOLOGY

## 2019-05-01 PROCEDURE — 74011000250 HC RX REV CODE- 250

## 2019-05-01 PROCEDURE — 76010000149 HC OR TIME 1 TO 1.5 HR: Performed by: OTOLARYNGOLOGY

## 2019-05-01 PROCEDURE — 76210000006 HC OR PH I REC 0.5 TO 1 HR: Performed by: OTOLARYNGOLOGY

## 2019-05-01 PROCEDURE — 74011636637 HC RX REV CODE- 636/637: Performed by: ANESTHESIOLOGY

## 2019-05-01 PROCEDURE — 77030008684 HC TU ET CUF COVD -B: Performed by: ANESTHESIOLOGY

## 2019-05-01 PROCEDURE — 77030013079 HC BLNKT BAIR HGGR 3M -A: Performed by: ANESTHESIOLOGY

## 2019-05-01 PROCEDURE — 76060000033 HC ANESTHESIA 1 TO 1.5 HR: Performed by: OTOLARYNGOLOGY

## 2019-05-01 PROCEDURE — 74011250637 HC RX REV CODE- 250/637: Performed by: OTOLARYNGOLOGY

## 2019-05-01 PROCEDURE — 77030002986 HC SUT PROL J&J -A: Performed by: OTOLARYNGOLOGY

## 2019-05-01 PROCEDURE — 77030040356 HC CORD BPLR FRCP COVD -A: Performed by: OTOLARYNGOLOGY

## 2019-05-01 PROCEDURE — 77030026438 HC STYL ET INTUB CARD -A: Performed by: ANESTHESIOLOGY

## 2019-05-01 PROCEDURE — 77030018836 HC SOL IRR NACL ICUM -A: Performed by: OTOLARYNGOLOGY

## 2019-05-01 PROCEDURE — 77030011640 HC PAD GRND REM COVD -A: Performed by: OTOLARYNGOLOGY

## 2019-05-01 PROCEDURE — 82962 GLUCOSE BLOOD TEST: CPT

## 2019-05-01 PROCEDURE — 77030002974 HC SUT PLN J&J -A: Performed by: OTOLARYNGOLOGY

## 2019-05-01 PROCEDURE — 74011250636 HC RX REV CODE- 250/636: Performed by: ANESTHESIOLOGY

## 2019-05-01 PROCEDURE — 77030020782 HC GWN BAIR PAWS FLX 3M -B

## 2019-05-01 RX ORDER — MIDAZOLAM HYDROCHLORIDE 1 MG/ML
INJECTION, SOLUTION INTRAMUSCULAR; INTRAVENOUS AS NEEDED
Status: DISCONTINUED | OUTPATIENT
Start: 2019-05-01 | End: 2019-05-01 | Stop reason: HOSPADM

## 2019-05-01 RX ORDER — CEFDINIR 300 MG/1
600 CAPSULE ORAL DAILY
Qty: 14 CAP | Refills: 0 | Status: SHIPPED | OUTPATIENT
Start: 2019-05-01 | End: 2019-05-08

## 2019-05-01 RX ORDER — HYDROMORPHONE HYDROCHLORIDE 1 MG/ML
.5-1 INJECTION, SOLUTION INTRAMUSCULAR; INTRAVENOUS; SUBCUTANEOUS
Status: DISCONTINUED | OUTPATIENT
Start: 2019-05-01 | End: 2019-05-01 | Stop reason: HOSPADM

## 2019-05-01 RX ORDER — NEOSTIGMINE METHYLSULFATE 1 MG/ML
INJECTION INTRAVENOUS AS NEEDED
Status: DISCONTINUED | OUTPATIENT
Start: 2019-05-01 | End: 2019-05-01 | Stop reason: HOSPADM

## 2019-05-01 RX ORDER — BACITRACIN ZINC 500 UNIT/G
OINTMENT (GRAM) TOPICAL AS NEEDED
Status: DISCONTINUED | OUTPATIENT
Start: 2019-05-01 | End: 2019-05-01 | Stop reason: HOSPADM

## 2019-05-01 RX ORDER — SODIUM CHLORIDE, SODIUM LACTATE, POTASSIUM CHLORIDE, CALCIUM CHLORIDE 600; 310; 30; 20 MG/100ML; MG/100ML; MG/100ML; MG/100ML
125 INJECTION, SOLUTION INTRAVENOUS CONTINUOUS
Status: DISCONTINUED | OUTPATIENT
Start: 2019-05-01 | End: 2019-05-01 | Stop reason: HOSPADM

## 2019-05-01 RX ORDER — FENTANYL CITRATE 50 UG/ML
INJECTION, SOLUTION INTRAMUSCULAR; INTRAVENOUS AS NEEDED
Status: DISCONTINUED | OUTPATIENT
Start: 2019-05-01 | End: 2019-05-01 | Stop reason: HOSPADM

## 2019-05-01 RX ORDER — PROPOFOL 10 MG/ML
INJECTION, EMULSION INTRAVENOUS AS NEEDED
Status: DISCONTINUED | OUTPATIENT
Start: 2019-05-01 | End: 2019-05-01 | Stop reason: HOSPADM

## 2019-05-01 RX ORDER — ONDANSETRON 2 MG/ML
4 INJECTION INTRAMUSCULAR; INTRAVENOUS AS NEEDED
Status: DISCONTINUED | OUTPATIENT
Start: 2019-05-01 | End: 2019-05-01 | Stop reason: HOSPADM

## 2019-05-01 RX ORDER — TORSEMIDE 10 MG/1
10 TABLET ORAL DAILY
COMMUNITY

## 2019-05-01 RX ORDER — ROCURONIUM BROMIDE 10 MG/ML
INJECTION, SOLUTION INTRAVENOUS AS NEEDED
Status: DISCONTINUED | OUTPATIENT
Start: 2019-05-01 | End: 2019-05-01 | Stop reason: HOSPADM

## 2019-05-01 RX ORDER — LIDOCAINE HYDROCHLORIDE 20 MG/ML
INJECTION, SOLUTION EPIDURAL; INFILTRATION; INTRACAUDAL; PERINEURAL AS NEEDED
Status: DISCONTINUED | OUTPATIENT
Start: 2019-05-01 | End: 2019-05-01 | Stop reason: HOSPADM

## 2019-05-01 RX ORDER — HYDROCODONE BITARTRATE AND ACETAMINOPHEN 5; 325 MG/1; MG/1
1 TABLET ORAL ONCE
Status: COMPLETED | OUTPATIENT
Start: 2019-05-01 | End: 2019-05-01

## 2019-05-01 RX ORDER — GLYCOPYRROLATE 0.2 MG/ML
INJECTION INTRAMUSCULAR; INTRAVENOUS AS NEEDED
Status: DISCONTINUED | OUTPATIENT
Start: 2019-05-01 | End: 2019-05-01 | Stop reason: HOSPADM

## 2019-05-01 RX ORDER — ONDANSETRON 4 MG/1
4 TABLET, ORALLY DISINTEGRATING ORAL
Qty: 20 TAB | Refills: 2 | Status: SHIPPED | OUTPATIENT
Start: 2019-05-01

## 2019-05-01 RX ORDER — EPHEDRINE SULFATE 50 MG/ML
INJECTION, SOLUTION INTRAVENOUS AS NEEDED
Status: DISCONTINUED | OUTPATIENT
Start: 2019-05-01 | End: 2019-05-01 | Stop reason: HOSPADM

## 2019-05-01 RX ORDER — DEXAMETHASONE SODIUM PHOSPHATE 4 MG/ML
INJECTION, SOLUTION INTRA-ARTICULAR; INTRALESIONAL; INTRAMUSCULAR; INTRAVENOUS; SOFT TISSUE AS NEEDED
Status: DISCONTINUED | OUTPATIENT
Start: 2019-05-01 | End: 2019-05-01 | Stop reason: HOSPADM

## 2019-05-01 RX ORDER — LIDOCAINE HYDROCHLORIDE AND EPINEPHRINE 10; 10 MG/ML; UG/ML
INJECTION, SOLUTION INFILTRATION; PERINEURAL AS NEEDED
Status: DISCONTINUED | OUTPATIENT
Start: 2019-05-01 | End: 2019-05-01 | Stop reason: HOSPADM

## 2019-05-01 RX ORDER — LIDOCAINE HYDROCHLORIDE 10 MG/ML
0.1 INJECTION, SOLUTION EPIDURAL; INFILTRATION; INTRACAUDAL; PERINEURAL AS NEEDED
Status: DISCONTINUED | OUTPATIENT
Start: 2019-05-01 | End: 2019-05-01 | Stop reason: HOSPADM

## 2019-05-01 RX ORDER — HYDROCODONE BITARTRATE AND ACETAMINOPHEN 5; 325 MG/1; MG/1
1 TABLET ORAL
Qty: 18 TAB | Refills: 0 | Status: SHIPPED | OUTPATIENT
Start: 2019-05-01 | End: 2019-05-04

## 2019-05-01 RX ORDER — ONDANSETRON 2 MG/ML
INJECTION INTRAMUSCULAR; INTRAVENOUS AS NEEDED
Status: DISCONTINUED | OUTPATIENT
Start: 2019-05-01 | End: 2019-05-01 | Stop reason: HOSPADM

## 2019-05-01 RX ADMIN — DEXAMETHASONE SODIUM PHOSPHATE 4 MG: 4 INJECTION, SOLUTION INTRA-ARTICULAR; INTRALESIONAL; INTRAMUSCULAR; INTRAVENOUS; SOFT TISSUE at 11:54

## 2019-05-01 RX ADMIN — EPHEDRINE SULFATE 10 MG: 50 INJECTION, SOLUTION INTRAVENOUS at 12:22

## 2019-05-01 RX ADMIN — FENTANYL CITRATE 100 MCG: 50 INJECTION, SOLUTION INTRAMUSCULAR; INTRAVENOUS at 11:40

## 2019-05-01 RX ADMIN — CEFAZOLIN 3 G: 1 INJECTION, POWDER, FOR SOLUTION INTRAMUSCULAR; INTRAVENOUS; PARENTERAL at 11:46

## 2019-05-01 RX ADMIN — ONDANSETRON 4 MG: 2 INJECTION INTRAMUSCULAR; INTRAVENOUS at 11:54

## 2019-05-01 RX ADMIN — EPHEDRINE SULFATE 10 MG: 50 INJECTION, SOLUTION INTRAVENOUS at 12:08

## 2019-05-01 RX ADMIN — INSULIN HUMAN 10 UNITS: 100 INJECTION, SOLUTION PARENTERAL at 10:03

## 2019-05-01 RX ADMIN — ROCURONIUM BROMIDE 40 MG: 10 INJECTION, SOLUTION INTRAVENOUS at 11:40

## 2019-05-01 RX ADMIN — LIDOCAINE HYDROCHLORIDE 80 MG: 20 INJECTION, SOLUTION EPIDURAL; INFILTRATION; INTRACAUDAL; PERINEURAL at 11:40

## 2019-05-01 RX ADMIN — PROPOFOL 200 MG: 10 INJECTION, EMULSION INTRAVENOUS at 11:40

## 2019-05-01 RX ADMIN — GLYCOPYRROLATE 0.4 MG: 0.2 INJECTION INTRAMUSCULAR; INTRAVENOUS at 12:51

## 2019-05-01 RX ADMIN — NEOSTIGMINE METHYLSULFATE 3 MG: 1 INJECTION INTRAVENOUS at 12:51

## 2019-05-01 RX ADMIN — SODIUM CHLORIDE, SODIUM LACTATE, POTASSIUM CHLORIDE, AND CALCIUM CHLORIDE 125 ML/HR: 600; 310; 30; 20 INJECTION, SOLUTION INTRAVENOUS at 09:34

## 2019-05-01 RX ADMIN — MIDAZOLAM HYDROCHLORIDE 2 MG: 1 INJECTION, SOLUTION INTRAMUSCULAR; INTRAVENOUS at 11:34

## 2019-05-01 RX ADMIN — HYDROCODONE BITARTRATE AND ACETAMINOPHEN 1 TABLET: 5; 325 TABLET ORAL at 14:35

## 2019-05-01 NOTE — ANESTHESIA POSTPROCEDURE EVALUATION
Procedure(s): PEDICLE DIVISION. general 
 
Anesthesia Post Evaluation Patient location during evaluation: bedside Level of consciousness: awake Pain management: satisfactory to patient Airway patency: patent Anesthetic complications: no 
Cardiovascular status: acceptable Respiratory status: acceptable Hydration status: acceptable Vitals Value Taken Time /68 5/1/2019  1:40 PM  
Temp 36.6 °C (97.8 °F) 5/1/2019  1:06 PM  
Pulse 69 5/1/2019  1:45 PM  
Resp 15 5/1/2019  1:45 PM  
SpO2 95 % 5/1/2019  1:45 PM  
Vitals shown include unvalidated device data.

## 2019-05-01 NOTE — DISCHARGE INSTRUCTIONS
Patient Discharge Instructions    Donna Levin / [id number] : 1945    Admitted 2019 Discharged: 2019              Forehead Flap Division Post-Operative Instructions     Have someone help you at home for 1-2 days. Get plenty of rest.  Follow a balanced diet. Take pain medication as prescribed. Do not take aspirin (or products that contain aspirin) unless approved by your surgeon. Do not drink alcohol while taking pain medication. DO NOT SMOKE. Smoking decreases blood flow. It can delay wound healing or cause tissue loss. Activities  Start walking as soon as possible, this helps to reduce swelling and lowers the chance of blood clots. Do not drive until you are no longer taking pain medication (narcotics). You may tire easily. Plan on taking it easy for the first week. No strenuous activity, including sex and heavy housework for 2 weeks post op. Avoid bending over. Keep your head elevated. Incision Care  Remove tape dressing in 24-48 hours. You may shower after 48 hours. Keep the incision lines coated with ointment while in the shower and avoid letting water spray directly on the incision. Mild oozing is expected and not uncommon. You can use a Q tip with peroxide and water (1/2 and 12) to gently clean along the suture lines. The sutures (stitches) will usually be removed in 1 week. No dressing is necessary. Apply antibiotic ointment 3-4 times per day to the incision line. Avoid exposure to sun for at least 12 months. What To Expect  Maximum discomfort should occur in the first few days, improving each day thereafter. Bruising, swelling, numbness, tightness and tenderness of skin for 10 - 14 days. The face may look and feel strange and be distorted from the swelling. Sensation may be decreased. This may or may not fully return. When to Call  If you have increased redness along the incision.    If you have increased pain that is not relieved by your pain medication. If you have any side effect from your medication; rash, nausea, vomiting, diarrhea, etc.   If you have a temperature greater than 101.0F   If you have yellow or green drainage from the incisions or notice a foul odor. If you have bleeding from the incisions that is difficult to control with light pressure. Follow-up with Brant Mcgrath MD in 10 days (call for appointment). If you have any questions, please call us at [phone]. We are always happy to answer your questions, and if you should have a problem, this number is answered 24 hours a day. DISCHARGE SUMMARY from your Nurse    The following personal items collected during your admission are returned to you:   Dental Appliance: Dental Appliances: Other (comment)(CROWNS)  Vision:    Hearing Aid:    Jewelry: Jewelry: None  Clothing: Clothing: Socks, Shirt, Pants, Undergarments, Footwear  Other Valuables:    Valuables sent to safe:      PATIENT INSTRUCTIONS:    After general anesthesia or intravenous sedation, for 24 hours or while taking prescription Narcotics:  · Limit your activities  · Do not drive and operate hazardous machinery  · Do not make important personal or business decisions  · Do  not drink alcoholic beverages  · If you have not urinated within 8 hours after discharge, please contact your surgeon on call. Report the following to your surgeon:  · Excessive pain, swelling, redness or odor of or around the surgical area  · Temperature over 100.5  · Nausea and vomiting lasting longer than 4 hours or if unable to take medications  · Any signs of decreased circulation or nerve impairment to extremity: change in color, persistent  numbness, tingling, coldness or increase pain  · Any questions    COUGH AND DEEP BREATHE    Breathing deep and coughing are very important exercises to do after surgery. Deep breathing and coughing open the little air tubes and air sacks in your lungs.       You take deep breaths every day.  You may not even notice - it is just something you do when you sigh or yawn. It is a natural exercise you do to keep these air passages open. After surgery, take deep breaths and cough, on purpose. Coughing and deep breathing help prevent bronchitis and pneumonia after surgery. If you had chest or belly surgery, use a pillow as a \"hug chinedu\" and hold it tightly to your chest or belly when you cough. DIRECTIONS:  6. Take 10 to 15 slow deep breaths every hour while awake. 7. Breathe in deeply, and hold it for 2 seconds. 8. Exhale slowly through puckered lips, like blowing up a balloon. 9. After every 4th or 5th deep breath, hug your pillow to your chest or belly and give a hard, deep cough. Yes, it will probably hurt. But doing this exercise is very important part of healing after surgery. Take your pain medicine to help you do this exercise without too much pain. IF YOU HAVE BEEN DIAGNOSED WITH SLEEP APNEA, PLEASE USE YOUR SLEEP APNEA DEVICE OR CPAP MACHINE WHEN YOU INTEND TO NAP AFTER TAKING PAIN MEDICATION. Ankle Pumps    Ankle pumps increase the circulation of oxygenated blood to your lower extremities and decrease your risk for circulation problems such as blood clots. They also stretch the muscles, tendons and ligaments in your foot and ankle, and prevent joint contracture in the ankle and foot, especially after surgeries on the legs. It is important to do ankle pump exercises regularly after surgery because immobility increases your risk for developing a blood clot. Your doctor may also have you take an Aspirin for the next few days as well. If your doctor did not ask you to take an Aspirin, consult with him before starting Aspirin therapy on your own. Slowly point your foot forward, feeling the muscles on the top of your lower leg stretch, and hold this position for 5 seconds.                   Next, pull your foot back toward you as far as possible, stretching the calf muscles, and hold that position for 5 seconds. Repeat with the other foot. Perform 10 repetitions every hour while awake for both ankles if possible (down and then up with the foot once is one repetition). You should feel gentle stretching of the muscles in your lower leg when doing this exercise. If you feel pain, or your range of motion is limited, don't  Push too hard. Only go the limit your joint and muscles will let you go. If you have increasing pain, progressively worsening leg warmth or swelling, STOP the exercise and call your doctor. Below is information about the medications your doctor is prescribing after your visit:    Other information in your discharge envelope:  []     PRESCRIPTIONS  []     PHYSICAL THERAPY PRESCRIPTION  []     APPOINTMENT CARDS  []     Regional Anesthesia Pamphlet for block or block with On-Q Catheter from Anesthesia Service  []     Medical device information sheets/pamphlets from their    []     School/work excuse note. []     /parent work excuse note. These are general instructions for a healthy lifestyle:    *  Please give a list of your current medications to your Primary Care Provider. *  Please update this list whenever your medications are discontinued, doses are      changed, or new medications (including over-the-counter products) are added. *  Please carry medication information at all times in case of emergency situations. About Smoking  No smoking / No tobacco products / Avoid exposure to second hand smoke    Surgeon General's Warning:  Quitting smoking now greatly reduces serious risk to your health. Obesity, smoking, and sedentary lifestyle greatly increases your risk for illness and disease. A healthy diet, regular physical exercise & weight monitoring are important for maintaining a healthy lifestyle.     Congestive Heart Failure  You may be retaining fluid if you have a history of heart failure or if you experience any of the following symptoms:  Weight gain of 3 pounds or more overnight or 5 pounds in a week, increased swelling in our hands or feet or shortness of breath while lying flat in bed. Please call your doctor as soon as you notice any of these symptoms; do not wait until your next office visit. Recognize signs and symptoms of STROKE:  F - face looks uneven  A - arms unable to move or move even  S - speech slurred or non-existent  T - time-call 911 as soon as signs and symptoms begin-DO NOT go         Back to bed or wait to see if you get better-TIME IS BRAIN. Warning signs of HEART ATTACK  Call 911 if you have these symptoms    · Chest discomfort. Most heart attacks involve discomfort in the center of the chest that lasts more than a few minutes, or that goes away and comes back. It can feel like uncomfortable pressure, squeezing, fullness, or pain. · Discomfort in other areas of the upper body. Symptoms can include pain or discomfort in one or both        Arms, the back, neck, jaw, or stomach. ·  Shortness of breath with or without chest discomfort. · Other signs may include breaking out in a cold sweat, nausea, or lightheadedness    Don't wait more than five minutes to call 911 - MINUTES MATTER! Fast action can save your life. Calling 911 is almost always the fastest way to get lifesaving treatment. Emergency Medical Services staff can begin treatment when they arrive - up to an hour sooner than if someone gets to the hospital by car. JEANNIE DURANT MEDICATION AND SIDE EFFECT GUIDE    The [facility] MEDICATION AND SIDE EFFECT GUIDE was provided to the PATIENT AND CARE PROVIDER.   Information provided includes instruction about drug purpose and common side effects for the following medications:    · [address]  · Tianna Gonzalez  · OMNICEF

## 2019-05-01 NOTE — PERIOP NOTES
Reviewed DC instructions with pt and sister. Both verbalize understanding; pt given first dose Norco for pain 4/10. Voided without diff.   IV d/c'd, cath intact, pt left via WC

## 2019-05-01 NOTE — H&P
Otolaryngology, Head and Neck Surgery    Chief Complaint:    History of Present Illness:   Patient is a 66 y.o. male who is being seen for forehead flap pedicle division. Had forehead flap for Mohs defect 3 weeks ago. No issues. Past Medical History:   Diagnosis Date    A-fib [facility])     BPH (benign prostatic hyperplasia)     CKD (chronic kidney disease) stage 3, GFR 30-59 ml/min ([facility])     Diabetes ([address]     Dyslipidemia     Eczema     GI bleed 02/24/2016    Hypertension     Mitral insufficiency     post MVR    Peripheral neuropathy     Skin cancer       History reviewed. No pertinent family history. Social History     Tobacco Use    Smoking status: Former Smoker    Smokeless tobacco: Never Used    Tobacco comment: Socially in college   Substance Use Topics    Alcohol use: Yes     Alcohol/week: 8.4 oz     Types: 7 Cans of beer, 7 Shots of liquor per week     Past Surgical History:   Procedure Laterality Date    HX COLONOSCOPY N/A     HX ENDOSCOPY N/A 02/2016    HX MITRAL VALVE REPLACEMENT N/A 2001    with Donato Jetty MV ring and repair    HX WISDOM TEETH EXTRACTION N/A       Current Facility-Administered Medications   Medication Dose Route Frequency    ceFAZolin (ANCEF) 3 g in 0.9%  ml IVPB  3 g IntraVENous ONCE    lactated Ringers infusion  125 mL/hr IntraVENous CONTINUOUS    lidocaine (PF) (XYLOCAINE) 10 mg/mL (1 %) injection 0.1 mL  0.1 mL SubCUTAneous PRN    bacitracin 50,000 Units, gentamicin 80 mg in 0.9% sodium chloride 1,000 mL Irrigation    PRN      Allergies   Allergen Reactions    Other Food Nausea Only     Scallops    Ace Inhibitors Other (comments)     \"I pass out. \"    Zestril [Lisinopril] Other (comments)     Syncope          Review of Systems:  Pertinent items are noted in the History of Present Illness.     Objective:     Patient Vitals for the past 8 hrs:   BP Temp Pulse Resp SpO2 Height Weight   05/01/19 0830 161/70 98.6 °F (37 °C) 73 16 99 % 6' 2\" (1.88 m) 122.9 kg (270 lb 15.1 oz)     Temp (24hrs), Av.6 °F (37 °C), Min:98.6 °F (37 °C), Max:98.6 °F (37 °C)    No intake/output data recorded. PHYSICAL EXAM:    CONSTITUTIONAL:  Well nourished individual in no acute distress. The patient is able to communicate with normal voice quality. HEAD AND NECK EXAM:    HEAD & FACE: No head or facial abnormalities present. No masses or lesions present. Overall appearance is normal. Facial strength is normal.  EYES: Conjunctiva normal.  No abnormalities of the lids or globes. Extraocular movements intact and conjugate. EARS: No significant abnormality of the external ear. NOSE: Forehead flap pedicle in place, clean. Flap on nose healing very well. SINUSES: The paranasal sinus regions are non-tender to palpation. ORAL CAVITY/OROPHARYNX: Lips and gums are without lesions. Oral cavity and oropharynx are without mucosal lesions or abnormalities. Tonsillar fossae, palate, tongue and uvula without abnormality. No edema. No erythema. NECK: No palpable lymphadenopathy or other masses in the neck. The trachea and larynx are midline. No thyroid enlargement or nodules appreciated. No abnormality of the parotid or submandibular glands present. LYMPHATIC: No lymphadenopathy in the neck/head. CHEST:  CTA  HEART:  RRR  NEUROLOGIC/PSYCHIATRIC:    NEUROLOGIC:  Cranial nerves 3, 4, 5, 6, 7, 10 (soft palate elevation), 11 and 12 bilaterally intact and symmetric. ORIENTATION/MOOD/AFFECT: Oriented to person, place, time and general circumstances. Mood and affect appropriate. Assessment:     S/p forehead flap for Mohs reconstruction    Plan:     Ready to proceed with division of pedicle. Questions answered. Consent signed. Signed By: Luca Ramirez MD     May 1, 2019       Pinky Kurtz MD, [facility], [facility]  [email or web address]  [email or web address]  [address] [place] [address] [address] [place] [address], [address] [facility]  Ph:  [phone]  Fax: [phone]

## 2019-05-01 NOTE — BRIEF OP NOTE
BRIEF OPERATIVE NOTE    Date of Procedure: 5/1/2019   Preoperative Diagnosis: BASAL CELL CARCINOMA NOSE  Postoperative Diagnosis: BASAL CELL CARCINOMA NOSE    Procedure(s):  PEDICLE DIVISION  Surgeon(s) and Role:     * Marta Christine MD - Primary         Surgical Assistant: none    Surgical Staff:  Circ-1: Klaudia Gonzalez RN  Circ-Relief: Melissa Jennings RN  Scrub Tech-1: [facility]  Scrub RN-Relief: Jakob Flores RN  Float Staff: Teetee Mg RN  Event Time In Time Out   Incision Start 1157    Incision Close 1254      Anesthesia: General   Estimated Blood Loss: min  Specimens: * No specimens in log *   Findings: healthy pedicle and nasal flap   Complications: none  Implants: * No implants in log *

## 2019-05-01 NOTE — PERIOP NOTES
Blood sugar 307. Pt stuck 3x unsuccessfully. Dr. Lei aware. Ultrasound placed at bedside. No new orders at this time.

## 2019-05-02 NOTE — OP NOTES
[facility] [address]  OPERATIVE REPORT    Name:  Parmjit Lindo  MR#:  [id number]  :  1945  ACCOUNT #:  [de-identified]  DATE OF SERVICE:  2019      PREOPERATIVE DIAGNOSIS:  Mohs defect of the nose. POSTOPERATIVE DIAGNOSIS:  Mohs defect of the nose. PROCEDURE PERFORMED:  Division of forehead flap pedicle with local flap inset of eyebrow. SURGEON:  Meriel Riedel, MD    ASSISTANT:  none    ANESTHESIA:  General.    COMPLICATIONS:  None. SPECIMENS REMOVED:  None. IMPLANTS:  none. ESTIMATED BLOOD LOSS:  Minimal.    FINDINGS:  There was an intact forehead flap pedicle with healthy-appearing flap on the nose with well-healed suture lines. INDICATIONS FOR PROCEDURE:  The patient is a 57-year-old male who underwent excision of a non-melanoma skin cancer of his nose about three weeks prior to this procedure. This required a forehead flap for coverage and he presents today for pedicle division. PROCEDURE IN DETAIL:  After informed consent, the patient was taken to the operating room and placed on the table in the supine position. After general anesthesia, the table was turned 180 degrees. The pedicle was examined and cleaned and then the patient was prepped and draped in standard fashion. Initially, some 1% lidocaine with 1:100,000 epinephrine was injected above the eyebrow, in the forehead. No injection was placed into the pedicle. The pedicle was then divided and about long term through. Minor oozing on the pedicle. Both the pedicle edges were controlled with bipolar cautery. Initially, the granulation tissue and frontalis muscle were excised from the posterior side of proximal part of the pedicle. Scar tissue was excised from the forehead donor site area. A small triangle was opened to access this area. Scissors were used to spread superiorly right on the bone to free up the eyebrow to allow to return to its normal position over the orbital rim.   After thinning this flap, it was trimmed to fit the triangular defect in the inferior forehead. Trimming was performed of the subcutaneous scar until the flap was the same thickness as the surrounding tissue. The flap was trimmed again as needed and then secured first with a tacking suture of 5-0 Prolene and then some 4-0 Vicryl for the deep layer, and 5-0 Prolene for the skin placed in an interrupted fashion. The contour was excellent at this point and the eyebrow was in good position. Attention was then turned to the nose. The frontalis and scar was excised from the back part of the pedicle. The superior part of the defect was freshened squaring off the edges superiorly. The soft tissue was excised deeply down to the supraperichondrial and supraperiosteal area to accommodate the thickness of the flap. The flap had good capillary refill and good bleeding at this point. It was inset by trimming it to fit the defect. Scissors were used to elevate in the supraperichondrial and supraperiosteal plane for a few centimeters around the defect and then the area was cleaned and then the flap was inset, and secured with multiple interrupted 5-0 Prolene sutures. The profile was excellent at this point with good tissue thickness matching the proximal flap and the inset. Again, there was good capillary refill at this point and the contour was excellent. The area was cleaned and then dressing of bacitracin, Telfa, and tape was placed on the nose and then bacitracin alone on the eyebrow. This concluded the procedure. The patient was then awakened from anesthesia, extubated, and taken to the PACU in stable condition. There were no complications. The patient tolerated the procedure well.         Betzy Hampton MD      [id number]  D:  05/01/2019 13:06  T:  05/01/2019 23:12  JOB #:  [id number]

## 2019-05-15 ENCOUNTER — HOSPITAL ENCOUNTER (EMERGENCY)
Age: 74
Discharge: HOME | End: 2019-05-15
Attending: EMERGENCY MEDICINE
Payer: MEDICARE

## 2019-05-15 VITALS
RESPIRATION RATE: 18 BRPM | TEMPERATURE: 98.4 F | OXYGEN SATURATION: 99 % | HEIGHT: 74 IN | SYSTOLIC BLOOD PRESSURE: 188 MMHG | DIASTOLIC BLOOD PRESSURE: 83 MMHG | BODY MASS INDEX: 33.37 KG/M2 | HEART RATE: 62 BPM | WEIGHT: 260 LBS

## 2019-05-15 DIAGNOSIS — R04.0 NOSEBLEED: Primary | ICD-10-CM

## 2019-05-15 PROCEDURE — 77030011649 HC PK NSL RHNO S&N -B

## 2019-05-15 PROCEDURE — 75810000284 HC CNTRL NASAL HEMORHRAGE SIMPLE

## 2019-05-15 PROCEDURE — 99284 EMERGENCY DEPT VISIT MOD MDM: CPT

## 2019-05-15 NOTE — ED TRIAGE NOTES
Patient c/o nose bleed that began about 8am this morning, bleeding has been controlled by nose clamp. Patient was advised to come in due to recent nasal sx and use of blood thinning medication.

## 2019-05-15 NOTE — DISCHARGE INSTRUCTIONS
Patient Education        Nosebleeds: Care Instructions  Your Care Instructions    Nosebleeds are common, especially if you have colds or allergies. Many things can cause a nosebleed. Some nosebleeds stop on their own with pressure. Others need packing. Some get cauterized (sealed). If you have gauze or other packing materials in your nose, you will need to follow up with your doctor to have the packing removed. You may need more treatment if you get nosebleeds a lot. The doctor has checked you carefully, but problems can develop later. If you notice any problems or new symptoms, get medical treatment right away. Follow-up care is a key part of your treatment and safety. Be sure to make and go to all appointments, and call your doctor if you are having problems. It's also a good idea to know your test results and keep a list of the medicines you take. How can you care for yourself at home? · If you get another nosebleed:  ? Sit up and tilt your head slightly forward. This keeps blood from going down your throat. ? Use your thumb and index finger to pinch your nose shut for 10 minutes. Use a clock. Do not check to see if the bleeding has stopped before the 10 minutes are up. If the bleeding has not stopped, pinch your nose shut for another 10 minutes. ? When the bleeding has stopped, try not to pick, rub, or blow your nose for 12 hours. Avoiding these things helps keep your nose from bleeding again. · If your doctor prescribed antibiotics, take them as directed. Do not stop taking them just because you feel better. You need to take the full course of antibiotics. To prevent nosebleeds  · Do not blow your nose too hard. · Try not to lift or strain after a nosebleed. · Raise your head on a pillow while you sleep. · Put a thin layer of a saline- or water-based nasal gel, such as NasoGel, inside your nose. Put it on the septum, which divides your nostrils.  This will prevent dryness that can cause nosebleeds. · Use a vaporizer or humidifier to add moisture to your bedroom. Follow the directions for cleaning the machine. · Do not use aspirin, ibuprofen (Advil, Motrin), or naproxen (Aleve) for 36 to 48 hours after a nosebleed unless your doctor tells you to. You can use acetaminophen (Tylenol) for pain relief. · Talk to your doctor about stopping any other medicines you are taking. Some medicines may make you more likely to get a nosebleed. · Do not use cold medicines or nasal sprays without first talking to your doctor. They can make your nose dry. When should you call for help? Call 911 anytime you think you may need emergency care. For example, call if:    · You passed out (lost consciousness).    Call your doctor now or seek immediate medical care if:    · You get another nosebleed and your nose is still bleeding after you have applied pressure 3 times for 10 minutes each time (30 minutes total).     · There is a lot of blood running down the back of your throat even after you pinch your nose and tilt your head forward.     · You have a fever.     · You have sinus pain.    Watch closely for changes in your health, and be sure to contact your doctor if:    · You get nosebleeds often, even if they stop.     · You do not get better as expected. Where can you learn more? Go to http://sammy-charmaine.info/. Enter S156 in the search box to learn more about \"Nosebleeds: Care Instructions. \"  Current as of: September 23, 2018  Content Version: 11.9  © 6470-8260 [employer]. Care instructions adapted under license by [employer] (which disclaims liability or warranty for this information). If you have questions about a medical condition or this instruction, always ask your healthcare professional. [address] any warranty or liability for your use of this information.

## 2019-05-15 NOTE — ED PROVIDER NOTES
66 y.o. male with past medical history significant for A-Fib, Diabetes, Peripheral neuropathy, Skin cancer, Dyslipidemia, Mitral insufficiency, BPH, CKD, and HTN who presents from [facility] with chief complaint of nose bleed. Patient reports onset of right nare nosebleed at 0800 this morning. Patient states he attempted to stop the nosebleed himself for ~30 minutes with no improvement, then went to the nurse at [facility] who could also not stop the bleeding. Patient presents to [facility] ED today with persisting bleeding from the right nare; however, upon examination the bleeding resolved. Patient reports taking Xarelto daily. Of note, patient reports having 2 recent surgeries (Mohs defect of the nose) in the last month with no complications, preformed by Dr. Zachery York MD. Patient denies N/V/D. There are no other acute medical concerns at this time. Social hx: (+) Tobacco use (Former), (+) EtOH use, No illicit drug use. PCP: Cynthia Vigil MD    Note written by Halima Mccarthy, as dictated by Antonia Romero MD 10:50 AM      The history is provided by the patient. No  was used. Past Medical History:   Diagnosis Date    A-fib [facility])     BPH (benign prostatic hyperplasia)     CKD (chronic kidney disease) stage 3, GFR 30-59 ml/min ([facility])     Diabetes ([facility] [address]     Dyslipidemia     Eczema     GI bleed 02/24/2016    Hypertension     Mitral insufficiency     post MVR    Peripheral neuropathy     Skin cancer        Past Surgical History:   Procedure Laterality Date    HX COLONOSCOPY N/A     HX ENDOSCOPY N/A 02/2016    HX MITRAL VALVE REPLACEMENT N/A 2001    with Sathish Berrios MV ring and repair    HX WISDOM TEETH EXTRACTION N/A          No family history on file.     Social History     Socioeconomic History    Marital status: SINGLE     Spouse name: Not on file    Number of children: Not on file    Years of education: Not on file    Highest education level: Not on file   Occupational History    Not on file   Social Needs    Financial resource strain: Not on file    Food insecurity:     Worry: Not on file     Inability: Not on file    Transportation needs:     Medical: Not on file     Non-medical: Not on file   Tobacco Use    Smoking status: Former Smoker    Smokeless tobacco: Never Used    Tobacco comment: Socially in college   Substance and Sexual Activity    Alcohol use: Yes     Alcohol/week: 8.4 oz     Types: 7 Cans of beer, 7 Shots of liquor per week    Drug use: No    Sexual activity: Not on file   Lifestyle    Physical activity:     Days per week: Not on file     Minutes per session: Not on file    Stress: Not on file   Relationships    Social connections:     Talks on phone: Not on file     Gets together: Not on file     Attends Jew service: Not on file     Active member of club or organization: Not on file     Attends meetings of clubs or organizations: Not on file     Relationship status: Not on file    Intimate partner violence:     Fear of current or ex partner: Not on file     Emotionally abused: Not on file     Physically abused: Not on file     Forced sexual activity: Not on file   Other Topics Concern    Not on file   Social History Narrative    Not on file         ALLERGIES: Other food; Ace inhibitors; and Zestril [lisinopril]    Review of Systems   Constitutional: Negative for chills and fever. HENT: Positive for nosebleeds. Eyes: Negative for visual disturbance. Respiratory: Negative for cough, shortness of breath and wheezing. Cardiovascular: Negative for chest pain and leg swelling. Gastrointestinal: Negative for abdominal pain, diarrhea, nausea and vomiting. Genitourinary: Negative for difficulty urinating and dysuria. Musculoskeletal: Negative. Negative for back pain and neck stiffness. Skin: Negative for rash. Neurological: Negative. Negative for syncope and headaches.    Psychiatric/Behavioral: Negative for confusion. All other systems reviewed and are negative. Vitals:    05/15/19 1006   BP: (!) 166/145   Pulse: 62   Resp: 18   Temp: 98.4 °F (36.9 °C)   SpO2: 98%   Weight: 117.9 kg (260 lb)   Height: 6' 2\" (1.88 m)            Physical Exam   Constitutional: He appears well-developed and well-nourished. No distress. HENT:   Head: Normocephalic. Posterior oropharynx has no blood. Eyes: Pupils are equal, round, and reactive to light. Neck: Normal range of motion. Cardiovascular: Normal rate. No murmur heard. Pulmonary/Chest: Effort normal and breath sounds normal.   Abdominal: Soft. There is no tenderness. Musculoskeletal: Normal range of motion. Neurological: He is alert. Skin: Skin is warm and dry. Capillary refill takes less than 2 seconds. Psychiatric: He has a normal mood and affect. His behavior is normal.   Note written by Halima Moreno, as dictated by Alicia Jacob MD 10:50 AM       MDM       Procedures 4.5 cm rhino inserted easily - bleeding resolved. CONSULT NOTE:  1:02 PM Alicia Jacob MD spoke with nurse for Dr. Janelle Hook for ENT. Discussed available diagnostic tests and clinical findings. Nurse states Dr. Chinedu Loo cannot see patient today because he is in the OR; however, Dr. Chinedu Loo suggests patient come in within the next 2 days to have his nasal packing removed.

## 2021-12-11 ENCOUNTER — HOSPITAL ENCOUNTER (EMERGENCY)
Age: 76
Discharge: HOME | End: 2021-12-11
Attending: EMERGENCY MEDICINE
Payer: MEDICARE

## 2021-12-11 VITALS
WEIGHT: 255 LBS | TEMPERATURE: 98 F | HEIGHT: 74 IN | SYSTOLIC BLOOD PRESSURE: 161 MMHG | OXYGEN SATURATION: 99 % | HEART RATE: 78 BPM | DIASTOLIC BLOOD PRESSURE: 87 MMHG | RESPIRATION RATE: 18 BRPM | BODY MASS INDEX: 32.73 KG/M2

## 2021-12-11 DIAGNOSIS — R31.0 GROSS HEMATURIA: Primary | ICD-10-CM

## 2021-12-11 LAB
ALBUMIN SERPL-MCNC: 3.2 G/DL (ref 3.5–5)
ALBUMIN/GLOB SERPL: 0.7 {RATIO} (ref 1.1–2.2)
ALP SERPL-CCNC: 67 U/L (ref 45–117)
ALT SERPL-CCNC: 27 U/L (ref 12–78)
ANION GAP SERPL CALC-SCNC: 9 MMOL/L (ref 5–15)
APPEARANCE UR: ABNORMAL
AST SERPL-CCNC: 28 U/L (ref 15–37)
BACTERIA URNS QL MICRO: NEGATIVE /HPF
BASOPHILS # BLD: 0.1 K/UL (ref 0–0.1)
BASOPHILS NFR BLD: 1 % (ref 0–1)
BILIRUB SERPL-MCNC: 0.5 MG/DL (ref 0.2–1)
BILIRUB UR QL: NEGATIVE
BUN SERPL-MCNC: 44 MG/DL (ref 6–20)
BUN/CREAT SERPL: 15 (ref 12–20)
CALCIUM SERPL-MCNC: 9.2 MG/DL (ref 8.5–10.1)
CHLORIDE SERPL-SCNC: 107 MMOL/L (ref 97–108)
CO2 SERPL-SCNC: 22 MMOL/L (ref 21–32)
COLOR UR: ABNORMAL
COMMENT, HOLDF: NORMAL
CREAT SERPL-MCNC: 2.99 MG/DL (ref 0.7–1.3)
DIFFERENTIAL METHOD BLD: ABNORMAL
EOSINOPHIL # BLD: 0.6 K/UL (ref 0–0.4)
EOSINOPHIL NFR BLD: 6 % (ref 0–7)
EPITH CASTS URNS QL MICRO: ABNORMAL /LPF
ERYTHROCYTE [DISTWIDTH] IN BLOOD BY AUTOMATED COUNT: 14.3 % (ref 11.5–14.5)
GLOBULIN SER CALC-MCNC: 4.5 G/DL (ref 2–4)
GLUCOSE SERPL-MCNC: 189 MG/DL (ref 65–100)
GLUCOSE UR STRIP.AUTO-MCNC: 100 MG/DL
HCT VFR BLD AUTO: 36.1 % (ref 36.6–50.3)
HGB BLD-MCNC: 12.1 G/DL (ref 12.1–17)
HGB UR QL STRIP: ABNORMAL
IMM GRANULOCYTES # BLD AUTO: 0.1 K/UL (ref 0–0.04)
IMM GRANULOCYTES NFR BLD AUTO: 1 % (ref 0–0.5)
KETONES UR QL STRIP.AUTO: NEGATIVE MG/DL
LEUKOCYTE ESTERASE UR QL STRIP.AUTO: NEGATIVE
LYMPHOCYTES # BLD: 2.1 K/UL (ref 0.8–3.5)
LYMPHOCYTES NFR BLD: 20 % (ref 12–49)
MCH RBC QN AUTO: 32.3 PG (ref 26–34)
MCHC RBC AUTO-ENTMCNC: 33.5 G/DL (ref 30–36.5)
MCV RBC AUTO: 96.3 FL (ref 80–99)
MONOCYTES # BLD: 1.2 K/UL (ref 0–1)
MONOCYTES NFR BLD: 12 % (ref 5–13)
NEUTS SEG # BLD: 6.1 K/UL (ref 1.8–8)
NEUTS SEG NFR BLD: 60 % (ref 32–75)
NITRITE UR QL STRIP.AUTO: NEGATIVE
NRBC # BLD: 0 K/UL (ref 0–0.01)
NRBC BLD-RTO: 0 PER 100 WBC
PH UR STRIP: 6 [PH] (ref 5–8)
PLATELET # BLD AUTO: 294 K/UL (ref 150–400)
PMV BLD AUTO: 9.3 FL (ref 8.9–12.9)
POTASSIUM SERPL-SCNC: 5.2 MMOL/L (ref 3.5–5.1)
PROT SERPL-MCNC: 7.7 G/DL (ref 6.4–8.2)
PROT UR STRIP-MCNC: 100 MG/DL
RBC # BLD AUTO: 3.75 M/UL (ref 4.1–5.7)
RBC #/AREA URNS HPF: >100 /HPF (ref 0–5)
SAMPLES BEING HELD,HOLD: NORMAL
SODIUM SERPL-SCNC: 138 MMOL/L (ref 136–145)
SP GR UR REFRACTOMETRY: 1.02 (ref 1–1.03)
UA: UC IF INDICATED,UAUC: ABNORMAL
UROBILINOGEN UR QL STRIP.AUTO: 0.2 EU/DL (ref 0.2–1)
WBC # BLD AUTO: 10.2 K/UL (ref 4.1–11.1)
WBC URNS QL MICRO: ABNORMAL /HPF (ref 0–4)

## 2021-12-11 PROCEDURE — 93005 ELECTROCARDIOGRAM TRACING: CPT

## 2021-12-11 PROCEDURE — 51798 US URINE CAPACITY MEASURE: CPT

## 2021-12-11 PROCEDURE — 81001 URINALYSIS AUTO W/SCOPE: CPT

## 2021-12-11 PROCEDURE — 99285 EMERGENCY DEPT VISIT HI MDM: CPT

## 2021-12-11 PROCEDURE — 36415 COLL VENOUS BLD VENIPUNCTURE: CPT

## 2021-12-11 PROCEDURE — 80053 COMPREHEN METABOLIC PANEL: CPT

## 2021-12-11 PROCEDURE — 85025 COMPLETE CBC W/AUTO DIFF WBC: CPT

## 2021-12-11 NOTE — ED PROVIDER NOTES
EMERGENCY DEPARTMENT HISTORY AND PHYSICAL EXAM          Date: 12/11/2021  Patient Name: Ana Jack    History of Presenting Illness     Chief Complaint   Patient presents with    Blood in Urine     onset 0100 today no hx of- increased frequently    Urinary Pain       History Provided By: Patient    HPI: Ana Jack is a 64 y.o. male, atrial fibrillation on 325 aspirin anticoagulation discontinued due to nasal bleeding and rectal bleeding, diabetes, peripheral neuropathy, dyslipidemia, BPH, CKD stage III, hypertension presenting from home with hematuria that began at 1 AM, prior to this patient was in usual health denies any back trauma falls injuries or  pain, drainage, lesions. Patient states that he has not been on any coagulation for the last 2 years denies any rectal bleeding, dizziness, lightheadedness states that he is passing clots with his urine but denies dysuria. Denies any coughing, runny nose, chest pain, shortness breath, abdominal pain. PCP: Marilee Martinez MD    There are no other complaints, changes, or physical findings at this time. Current Outpatient Medications   Medication Sig Dispense Refill    torsemide (DEMADEX) 10 mg tablet Take 10 mg by mouth daily.  ondansetron (ZOFRAN ODT) 4 mg disintegrating tablet Take 1 Tab by mouth every four (4) hours as needed for Nausea. 20 Tab 2    ondansetron (ZOFRAN ODT) 4 mg disintegrating tablet Take 1 Tab by mouth every four (4) hours as needed for Nausea. 20 Tab 2    bacitracin zinc (BACITRACIN) ointment Apply to suture lines on nose and forehead 3x/day. 15 g 0    linagliptin (TRADJENTA) 5 mg tablet Take 5 mg by mouth daily.  cyanocobalamin (VITAMIN B-12) 2,500 mcg sublingual tablet Take 2,500 mcg by mouth daily.  triamcinolone acetonide (KENALOG) 0.025 % ointment Apply  to affected area two (2) times daily as needed. use thin layer      lidocaine HCl-hydrocortison ac 2-2 % crea Insert  into rectum as needed.       cholecalciferol, VITAMIN D3, (VITAMIN D3) 5,000 unit tab tablet Take 5,000 Units by mouth daily.  ezetimibe-simvastatin (VYTORIN) 10-40 mg per tablet Take 0.5 Tabs by mouth every evening.  digoxin (LANOXIN) 0.25 mg tablet Take 0.25 mg by mouth nightly.  dutasteride (AVODART) 0.5 mg capsule Take 0.5 mg by mouth nightly.  metoprolol succinate (TOPROL-XL) 100 mg tablet Take 100 mg by mouth two (2) times a day. Past History     Past Medical History:  Past Medical History:   Diagnosis Date    [facility]     BPH (benign prostatic hyperplasia)     CKD (chronic kidney disease) stage 3, GFR 30-59 ml/min ([facility])     Diabetes ([facility]     Dyslipidemia     Eczema     GI bleed 02/24/2016    Hypertension     Mitral insufficiency     post MVR    Peripheral neuropathy     Skin cancer        Past Surgical History:  Past Surgical History:   Procedure Laterality Date    HX COLONOSCOPY N/A     HX ENDOSCOPY N/A 02/2016    HX MITRAL VALVE REPLACEMENT N/A 2001    with Yanira Slipper MV ring and repair    HX WISDOM TEETH EXTRACTION N/A        Family History:  No family history on file. Social History:  Social History     Tobacco Use    Smoking status: Former Smoker    Smokeless tobacco: Never Used    Tobacco comment: Socially in college   Substance Use Topics    Alcohol use: Yes     Alcohol/week: 14.0 standard drinks     Types: 7 Cans of beer, 7 Shots of liquor per week    Drug use: No       Allergies: Allergies   Allergen Reactions    Other Food Nausea Only     Scallops    Ace Inhibitors Other (comments)     \"I pass out. \"    Zestril [Lisinopril] Other (comments)     Syncope           Review of Systems   Review of Systems   Constitutional: Negative for chills, fatigue and fever. HENT: Negative for nosebleeds and rhinorrhea. Respiratory: Negative for cough, shortness of breath and stridor. Gastrointestinal: Negative for abdominal pain, blood in stool, diarrhea, nausea and vomiting. Genitourinary: Positive for hematuria. Negative for dysuria. Neurological: Negative for dizziness, light-headedness and headaches. All other systems reviewed and are negative. Physical Exam   Physical Exam  Constitutional:       Appearance: He is normal weight. HENT:      Head: Normocephalic and atraumatic. Comments: Well-healed scar right forehead     Nose: Nose normal.      Mouth/Throat:      Mouth: Mucous membranes are moist.      Pharynx: Oropharynx is clear. Eyes:      Extraocular Movements: Extraocular movements intact. Cardiovascular:      Rate and Rhythm: Normal rate and regular rhythm. Pulses: Normal pulses. Heart sounds: Normal heart sounds. Comments: Appears to be normal sinus at this time  Pulmonary:      Effort: Pulmonary effort is normal.      Breath sounds: Normal breath sounds. Abdominal:      General: Abdomen is flat. Bowel sounds are normal.      Palpations: Abdomen is soft. Genitourinary:     Penis: Normal.       Testes: Normal.   Musculoskeletal:         General: Swelling present. Normal range of motion. Cervical back: Normal range of motion. Comments: 3+ pitting edema bilaterally at baseline   Skin:     Capillary Refill: Capillary refill takes less than 2 seconds. Neurological:      General: No focal deficit present. Mental Status: He is alert and oriented to person, place, and time. Mental status is at baseline.           Diagnostic Study Results     Labs -     Recent Results (from the past 12 hour(s))   URINALYSIS W/ REFLEX CULTURE    Collection Time: 12/11/21  9:31 AM    Specimen: Urine   Result Value Ref Range    Color RED      Appearance BLOODY (A) CLEAR      Specific gravity 1.020 1.003 - 1.030      pH (UA) 6.0 5.0 - 8.0      Protein 100 (A) NEG mg/dL    Glucose 100 (A) NEG mg/dL    Ketone Negative NEG mg/dL    Bilirubin Negative NEG      Blood LARGE (A) NEG      Urobilinogen 0.2 0.2 - 1.0 EU/dL    Nitrites Negative NEG Leukocyte Esterase Negative NEG      WBC 5-10 0 - 4 /hpf    RBC >100 (H) 0 - 5 /hpf    Epithelial cells FEW FEW /lpf    Bacteria Negative NEG /hpf    UA:UC IF INDICATED CULTURE NOT INDICATED BY UA RESULT CNI     CBC WITH AUTOMATED DIFF    Collection Time: 12/11/21  9:43 AM   Result Value Ref Range    WBC 10.2 4.1 - 11.1 K/uL    RBC 3.75 (L) 4.10 - 5.70 M/uL    HGB 12.1 12.1 - 17.0 g/dL    HCT 36.1 (L) 36.6 - 50.3 %    MCV 96.3 80.0 - 99.0 FL    MCH 32.3 26.0 - 34.0 PG    MCHC 33.5 30.0 - 36.5 g/dL    RDW 14.3 11.5 - 14.5 %    PLATELET 274 753 - 878 K/uL    MPV 9.3 8.9 - 12.9 FL    NRBC 0.0 0  WBC    ABSOLUTE NRBC 0.00 0.00 - 0.01 K/uL    NEUTROPHILS 60 32 - 75 %    LYMPHOCYTES 20 12 - 49 %    MONOCYTES 12 5 - 13 %    EOSINOPHILS 6 0 - 7 %    BASOPHILS 1 0 - 1 %    IMMATURE GRANULOCYTES 1 (H) 0.0 - 0.5 %    ABS. NEUTROPHILS 6.1 1.8 - 8.0 K/UL    ABS. LYMPHOCYTES 2.1 0.8 - 3.5 K/UL    ABS. MONOCYTES 1.2 (H) 0.0 - 1.0 K/UL    ABS. EOSINOPHILS 0.6 (H) 0.0 - 0.4 K/UL    ABS. BASOPHILS 0.1 0.0 - 0.1 K/UL    ABS. IMM. GRANS. 0.1 (H) 0.00 - 0.04 K/UL    DF AUTOMATED     METABOLIC PANEL, COMPREHENSIVE    Collection Time: 12/11/21  9:43 AM   Result Value Ref Range    Sodium 138 136 - 145 mmol/L    Potassium 5.2 (H) 3.5 - 5.1 mmol/L    Chloride 107 97 - 108 mmol/L    CO2 22 21 - 32 mmol/L    Anion gap 9 5 - 15 mmol/L    Glucose 189 (H) 65 - 100 mg/dL    BUN 44 (H) 6 - 20 MG/DL    Creatinine 2.99 (H) 0.70 - 1.30 MG/DL    BUN/Creatinine ratio 15 12 - 20      GFR est AA 25 (L) >60 ml/min/1.73m2    GFR est non-AA 20 (L) >60 ml/min/1.73m2    Calcium 9.2 8.5 - 10.1 MG/DL    Bilirubin, total 0.5 0.2 - 1.0 MG/DL    ALT (SGPT) 27 12 - 78 U/L    AST (SGOT) 28 15 - 37 U/L    Alk.  phosphatase 67 45 - 117 U/L    Protein, total 7.7 6.4 - 8.2 g/dL    Albumin 3.2 (L) 3.5 - 5.0 g/dL    Globulin 4.5 (H) 2.0 - 4.0 g/dL    A-G Ratio 0.7 (L) 1.1 - 2.2     SAMPLES BEING HELD    Collection Time: 12/11/21  9:43 AM   Result Value Ref Range SAMPLES BEING HELD PST     COMMENT        Add-on orders for these samples will be processed based on acceptable specimen integrity and analyte stability, which may vary by analyte. EKG, 12 LEAD, INITIAL    Collection Time: 12/11/21  9:55 AM   Result Value Ref Range    Ventricular Rate 72 BPM    Atrial Rate 97 BPM    QRS Duration 92 ms    Q-T Interval 406 ms    QTC Calculation (Bezet) 444 ms    Calculated R Axis 7 degrees    Calculated T Axis 79 degrees    Diagnosis       Atrial fibrillation  Nonspecific ST abnormality  When compared with ECG of 29-MAR-2019 09:08,  Nonspecific T wave abnormality no longer evident in Inferior leads         Radiologic Studies -   No orders to display     CT Results  (Last 48 hours)    None        CXR Results  (Last 48 hours)    None            Medical Decision Making   I am the first provider for this patient. I reviewed the vital signs, available nursing notes, past medical history, past surgical history, family history and social history. Vital Signs-Reviewed the patient's vital signs. Patient Vitals for the past 12 hrs:   Temp Pulse Resp BP SpO2   12/11/21 0923 98 °F (36.7 °C) 78 18 (!) 161/87 99 %       ECG Interpretation: AF rate controlled, nonischemic     Records Reviewed: Nursing Notes and Old Medical Records    Provider Notes (Medical Decision Making):   DDx: Hematuria, UTI, cystitis    75-year-old male history of atrial fibrillation on full strength aspirin discontinued anticoagulation 2 years ago due to blood per rectum and nosebleeds presenting with hematuria that started suddenly at 1 AM, passing clots able to continue to void freely denies dysuria. Patient further denies any trauma or falls involving groin or back. On examination has no CVA tenderness no abnormality identified in the groin.      CBC, CMP, urinalysis ordered additionally patient sounded to be in regular normal sinus so EKG was obtained in hopes of confirming normal sinus and possibly decreasing strength of aspirin, however EKG confirmed patient was still in atrial fibrillation. Cbc confirmed no infection, anemia- hemoglobin is at baseline. Urinalysis confirmed hematuria without UTI. Disposition at this time is pending CMP patient will likely be discharged with PCP and urology follow-up. Patient is currently without symptoms including dizziness or lightheadedness. ED Course and Progress Notes:   Initial assessment performed. The patients presenting problems have been discussed, and they are in agreement with the care plan formulated and outlined with them. I have encouraged them to ask questions as they arise throughout their visit. ED Course as of 12/11/21 1633   Sat Dec 11, 2021   1013 EKG reviewed consistent with atrial fibrillation heart rate in the 70s, CBC reviewed hemoglobin near patient's baseline, urine reviewed bloody appearance with greater than 100 RBCs negative for bacteria. [SS]   1030 Potassium(!): 5.2  CMP reviewed potassium 5.2 but sample was hemolyzed, creatinine 2.99 up from 2.04 but this lab result was from 2 years ago [SS]   1036 Has documentation from his PCP at bedside PCP documentation from October 2021 notes CKD stage IV with GFR of 20, and notes that patient declined to see nephrologist because he is certain that he would not want dialysis. GFR today is 20 given this suspect that creatinine is near patient's baseline as well. [SS]   1040 Obtain post void residual patient urinating now to confirm no urinary retention thus if negative lower suspicion for hydronephrosis [SS]   1054 Postvoid residual 54 cc given this low concern for retention at this time patient was also offered a catheter insertion but declined. Return precautions have been given, labs reviewed with patient, questions and concerns have been addressed, follow-up offered. Will discharge. [SS]      ED Course User Index  [SS] Carline Adamson MD       Diagnosis     Clinical Impression:   1.  Juan Grady hematuria        Disposition:  home    DISCHARGE PLAN:    1. Discharge Medication List as of 12/11/2021 10:55 AM      CONTINUE these medications which have NOT CHANGED    Details   torsemide (DEMADEX) 10 mg tablet Take 10 mg by mouth daily. , Historical Med      !! ondansetron (ZOFRAN ODT) 4 mg disintegrating tablet Take 1 Tab by mouth every four (4) hours as needed for Nausea. , Print, Disp-20 Tab, R-2      !! ondansetron (ZOFRAN ODT) 4 mg disintegrating tablet Take 1 Tab by mouth every four (4) hours as needed for Nausea. , Print, Disp-20 Tab, R-2      bacitracin zinc (BACITRACIN) ointment Apply to suture lines on nose and forehead 3x/day., Print, Disp-15 g, R-0      linagliptin (TRADJENTA) 5 mg tablet Take 5 mg by mouth daily. , Historical Med      cyanocobalamin (VITAMIN B-12) 2,500 mcg sublingual tablet Take 2,500 mcg by mouth daily. , Historical Med      triamcinolone acetonide (KENALOG) 0.025 % ointment Apply  to affected area two (2) times daily as needed. use thin layer, Historical Med      lidocaine HCl-hydrocortison ac 2-2 % crea Insert  into rectum as needed., Historical Med      cholecalciferol, VITAMIN D3, (VITAMIN D3) 5,000 unit tab tablet Take 5,000 Units by mouth daily. , Historical Med      ezetimibe-simvastatin (VYTORIN) 10-40 mg per tablet Take 0.5 Tabs by mouth every evening., Historical Med      digoxin (LANOXIN) 0.25 mg tablet Take 0.25 mg by mouth nightly., Historical Med      dutasteride (AVODART) 0.5 mg capsule Take 0.5 mg by mouth nightly., Historical Med      metoprolol succinate (TOPROL-XL) 100 mg tablet Take 100 mg by mouth two (2) times a day., Historical Med       !! - Potential duplicate medications found. Please discuss with provider.         2.   Follow-up Information     Follow up With Specialties Details Why Contact Info    Kimmie Espinal MD Family Medicine In 1 week  [address] [place] [address]  [phone]      [facility] EMERGENCY DEPT Emergency Medicine  As needed Rosalind Keith [address] [address]  [phone]    [facility] Division of Urology    [address]  [address]    [facility]    [facility] [address]   [address] [id number]        3.  Return to ED if worse         Resident Signature: Irving Mcqueen MD PGY4 EM/IM Resident

## 2021-12-11 NOTE — DISCHARGE INSTRUCTIONS
You are found to have gross hematuria in your blood you will likely need to be evaluated further by urology. Your hemoglobin is near baseline suggesting that you are not any more anemic than normal given this you do not need to be admitted or emergently worked up. Follow-up with your primary care doctor as well in the next 1 to 2 weeks, finally please follow-up with your cardiologist to discuss decreasing your aspirin dose. You may benefit from a risk-benefit discussion regarding anticoagulation given your risk for stroke in setting of atrial fibrillation but also ongoing bleeding.     Come back to the emergency room immediately if you have any worsening hematuria, clots, pain, inability to urinate, fevers or chills, dizziness or lightheadedness upon standing

## 2021-12-11 NOTE — ED NOTES
Ketty Palacio reviewed discharge instructions with the patient. The patient verbalized understanding. All questions and concerns were addressed. The patient is discharged via wheelchair in the care of family members with instructions and prescriptions in hand. Pt is alert and oriented x 4. Respirations are clear and unlabored. Pt waiting in waiting room for ride from [facility] staff arranged ride to pick him up. Ketty Palacio RN called and gave report to facility.

## 2021-12-11 NOTE — Clinical Note
[address]  [facility] EMERGENCY DEPT  [address] [facility] [id number]  [phone]    Work/School Note    Date: 12/11/2021    To Whom It May concern:      Shahram Georges was seen and treated today in the emergency room by the following provider(s):  Attending Provider: Nevin Garcia MD  Resident: Carline Adamson MD.      Shahram Georges is excused from work/school on 12/11/21. He is clear to return to work/school on 12/12/21.         Sincerely,          Reza Roth MD

## 2021-12-12 LAB
ATRIAL RATE: 97 BPM
CALCULATED R AXIS, ECG10: 7 DEGREES
CALCULATED T AXIS, ECG11: 79 DEGREES
DIAGNOSIS, 93000: NORMAL
Q-T INTERVAL, ECG07: 406 MS
QRS DURATION, ECG06: 92 MS
QTC CALCULATION (BEZET), ECG08: 444 MS
VENTRICULAR RATE, ECG03: 72 BPM

## (undated) DEVICE — (D)SUT PLN FST 6-0 18IN PC1 -- DISC BY MFR

## (undated) DEVICE — ROCKER SWITCH PENCIL BLADE ELECTRODE, HOLSTER: Brand: EDGE

## (undated) DEVICE — SUTURE ABSORBABLE BRAIDED 4-0 P-3 18 IN UD VICRYL J494G

## (undated) DEVICE — SUT PROL 5-0 18IN P3 BLU --

## (undated) DEVICE — 3M™ MICROPORE™ TAPE ¸INCH X 10 YARDS, TAN, 24 ROLLS/CARTON, 10 CARTONS/CASE, 1533-0: Brand: 3M™ MICROPORE™

## (undated) DEVICE — BIPOLAR FORCEPS CORD: Brand: VALLEYLAB

## (undated) DEVICE — DEVON™ KNEE AND BODY STRAP 60" X 3" (1.5 M X 7.6 CM): Brand: DEVON

## (undated) DEVICE — INFECTION CONTROL KIT SYS

## (undated) DEVICE — GAUZE,SPONGE,4"X4",16PLY,XRAY,STRL,LF: Brand: MEDLINE

## (undated) DEVICE — REM POLYHESIVE ADULT PATIENT RETURN ELECTRODE: Brand: VALLEYLAB

## (undated) DEVICE — Z DISCONTINUEDSOLUTION PREP 2OZ 10% POVIDONE IOD SCR CAP BTL

## (undated) DEVICE — PACK PROCEDURE SURG ENT CUST

## (undated) DEVICE — ARGYLE FRAZIER SURGICAL SUCTION INSTRUMENT 10 FR/CH (3.3 MM): Brand: ARGYLE

## (undated) DEVICE — LIGHT HANDLE: Brand: DEVON

## (undated) DEVICE — SOLUTION IV 1000ML 0.9% SOD CHL

## (undated) DEVICE — TELFA NON-ADHERENT ABSORBENT DRESSING: Brand: TELFA

## (undated) DEVICE — SYR IRR BLB 2OZ DISP BLU STRL -- CONVERT TO ITEM 357637

## (undated) DEVICE — STERILE POLYISOPRENE POWDER-FREE SURGICAL GLOVES: Brand: PROTEXIS

## (undated) DEVICE — SUTURE VCRL SZ 3-0 L27IN ABSRB UD L26MM SH 1/2 CIR J416H

## (undated) DEVICE — DRAPE SHT 3 QTR PROXIMA 53X77 --

## (undated) DEVICE — OCCLUSIVE GAUZE STRIP,3% BISMUTH TRIBROMOPHENATE IN PETROLATUM BLEND: Brand: XEROFORM

## (undated) DEVICE — SUT PLN 5-0 18IN P3 YEL --

## (undated) DEVICE — SUTURE PROL SZ 4-0 L18IN NONABSORBABLE BLU L13MM P-3 3/8 8699G

## (undated) DEVICE — NEEDLE SPNL 22GA L3.5IN BLK HUB S STL REG WALL FIT STYL W/

## (undated) DEVICE — SUT PROL 5-0 18IN PS2 BLU --

## (undated) DEVICE — APPLICATOR FBR TIP L6IN COT TIP WOOD SHFT SWAB 2000 PER CA

## (undated) DEVICE — SOL IRRIGATION INJ NACL 0.9% 500ML BTL

## (undated) DEVICE — STRIP,CLOSURE,WOUND,MEDI-STRIP,1/2X4: Brand: MEDLINE

## (undated) DEVICE — 3000CC GUARDIAN II: Brand: GUARDIAN

## (undated) DEVICE — MASTISOL ADHESIVE LIQ 2/3ML